# Patient Record
Sex: FEMALE | Race: WHITE | Employment: OTHER | URBAN - METROPOLITAN AREA
[De-identification: names, ages, dates, MRNs, and addresses within clinical notes are randomized per-mention and may not be internally consistent; named-entity substitution may affect disease eponyms.]

---

## 2017-05-05 ENCOUNTER — TELEPHONE (OUTPATIENT)
Dept: GASTROENTEROLOGY | Facility: CLINIC | Age: 62
End: 2017-05-05

## 2017-05-05 DIAGNOSIS — K74.3 PBC (PRIMARY BILIARY CIRRHOSIS): ICD-10-CM

## 2017-05-05 RX ORDER — URSODIOL 250 MG/1
250 TABLET, FILM COATED ORAL 4 TIMES DAILY
Qty: 148 TABLET | Refills: 1 | Status: SHIPPED | OUTPATIENT
Start: 2017-05-05 | End: 2017-07-03

## 2017-05-05 NOTE — TELEPHONE ENCOUNTER
----- Message from Evert Riggs MD sent at 5/5/2017  2:58 PM CDT -----  That is ok this time.  i won't refill again without a more recent clinic visit.    Thanks    ----- Message -----     From: Melany Greene LPN     Sent: 5/5/2017  10:56 AM       To: Melany Greene LPN, Evert Riggs MD    This pt doesn't see you until July and she is requesting a refill for ursodiol. Pt hasn't seen you since December 2015.

## 2017-05-08 ENCOUNTER — TELEPHONE (OUTPATIENT)
Dept: GASTROENTEROLOGY | Facility: CLINIC | Age: 62
End: 2017-05-08

## 2017-05-16 ENCOUNTER — TELEPHONE (OUTPATIENT)
Dept: GASTROENTEROLOGY | Facility: CLINIC | Age: 62
End: 2017-05-16

## 2017-05-19 ENCOUNTER — TELEPHONE (OUTPATIENT)
Dept: GASTROENTEROLOGY | Facility: CLINIC | Age: 62
End: 2017-05-19

## 2017-05-19 DIAGNOSIS — K74.3 PRIMARY BILIARY CIRRHOSIS (H): ICD-10-CM

## 2017-05-19 RX ORDER — URSODIOL 250 MG/1
250 TABLET, FILM COATED ORAL 4 TIMES DAILY
Qty: 360 TABLET | Refills: 0 | Status: SHIPPED | OUTPATIENT
Start: 2017-05-19 | End: 2017-07-03

## 2017-05-19 NOTE — TELEPHONE ENCOUNTER
----- Message from Sharon Byrne RN sent at 5/19/2017  9:55 AM CDT -----  Regarding: FW: Dr Brooks - Rx: ursodiol req refill be sent to mail order pharmacy  Contact: [phone]      ----- Message -----     From: Flori Varner     Sent: 5/19/2017   9:42 AM       To: Hepatology Nurses-  Subject: Dr Brooks - Rx: ursodiol req refill be sent to #    Pt called her mail order pharmacy and stated they have not received any refill reqs from the . She is almost out and wants to know when she can get that refilled. Pt can be reached at [phone] [facility] - Adult call center  Please DO NOT send this message and/or reply back to sender.  Call Center Representatives DO NOT respond to messages.

## 2017-05-19 NOTE — TELEPHONE ENCOUNTER
Informed pt that the ursodiol script was set for 90 days but with no refills as she would need to be seen in clinic. Pt states she has an appt in July.

## 2017-06-19 DIAGNOSIS — K74.3 PRIMARY BILIARY CIRRHOSIS (H): Primary | ICD-10-CM

## 2017-07-03 ENCOUNTER — OFFICE VISIT (OUTPATIENT)
Dept: GASTROENTEROLOGY | Facility: CLINIC | Age: 62
End: 2017-07-03
Attending: INTERNAL MEDICINE
Payer: COMMERCIAL

## 2017-07-03 VITALS
TEMPERATURE: 98.2 F | DIASTOLIC BLOOD PRESSURE: 84 MMHG | WEIGHT: 138 LBS | SYSTOLIC BLOOD PRESSURE: 166 MMHG | BODY MASS INDEX: 22.99 KG/M2 | HEIGHT: 65 IN | HEART RATE: 75 BPM

## 2017-07-03 DIAGNOSIS — K74.3 PRIMARY BILIARY CIRRHOSIS (H): Primary | ICD-10-CM

## 2017-07-03 DIAGNOSIS — D69.6 THROMBOCYTOPENIA (H): ICD-10-CM

## 2017-07-03 DIAGNOSIS — K74.3 PRIMARY BILIARY CIRRHOSIS (H): ICD-10-CM

## 2017-07-03 LAB
ALBUMIN SERPL-MCNC: 3.7 G/DL (ref 3.4–5)
ALP SERPL-CCNC: 147 U/L (ref 40–150)
ALT SERPL W P-5'-P-CCNC: 34 U/L (ref 0–50)
ANION GAP SERPL CALCULATED.3IONS-SCNC: 7 MMOL/L (ref 3–14)
AST SERPL W P-5'-P-CCNC: 26 U/L (ref 0–45)
BILIRUB DIRECT SERPL-MCNC: 0.3 MG/DL (ref 0–0.2)
BILIRUB SERPL-MCNC: 1 MG/DL (ref 0.2–1.3)
BUN SERPL-MCNC: 12 MG/DL (ref 7–30)
CALCIUM SERPL-MCNC: 9.2 MG/DL (ref 8.5–10.1)
CHLORIDE SERPL-SCNC: 105 MMOL/L (ref 94–109)
CO2 SERPL-SCNC: 27 MMOL/L (ref 20–32)
CREAT SERPL-MCNC: 0.62 MG/DL (ref 0.52–1.04)
ERYTHROCYTE [DISTWIDTH] IN BLOOD BY AUTOMATED COUNT: 12.7 % (ref 10–15)
GFR SERPL CREATININE-BSD FRML MDRD: ABNORMAL ML/MIN/1.7M2
GLUCOSE SERPL-MCNC: 103 MG/DL (ref 70–99)
HCT VFR BLD AUTO: 41 % (ref 35–47)
HGB BLD-MCNC: 13.4 G/DL (ref 11.7–15.7)
INR PPP: 1.04 (ref 0.86–1.14)
MCH RBC QN AUTO: 30.7 PG (ref 26.5–33)
MCHC RBC AUTO-ENTMCNC: 32.7 G/DL (ref 31.5–36.5)
MCV RBC AUTO: 94 FL (ref 78–100)
PLATELET # BLD AUTO: 68 10E9/L (ref 150–450)
POTASSIUM SERPL-SCNC: 4.1 MMOL/L (ref 3.4–5.3)
PROT SERPL-MCNC: 7.7 G/DL (ref 6.8–8.8)
RBC # BLD AUTO: 4.37 10E12/L (ref 3.8–5.2)
SODIUM SERPL-SCNC: 139 MMOL/L (ref 133–144)
WBC # BLD AUTO: 6.4 10E9/L (ref 4–11)

## 2017-07-03 PROCEDURE — 80048 BASIC METABOLIC PNL TOTAL CA: CPT | Performed by: INTERNAL MEDICINE

## 2017-07-03 PROCEDURE — 85027 COMPLETE CBC AUTOMATED: CPT | Performed by: INTERNAL MEDICINE

## 2017-07-03 PROCEDURE — 36415 COLL VENOUS BLD VENIPUNCTURE: CPT | Performed by: INTERNAL MEDICINE

## 2017-07-03 PROCEDURE — 85610 PROTHROMBIN TIME: CPT | Performed by: INTERNAL MEDICINE

## 2017-07-03 PROCEDURE — 99212 OFFICE O/P EST SF 10 MIN: CPT | Mod: ZF

## 2017-07-03 PROCEDURE — 80076 HEPATIC FUNCTION PANEL: CPT | Performed by: INTERNAL MEDICINE

## 2017-07-03 RX ORDER — ESTRADIOL 10 UG/1
10 INSERT VAGINAL
COMMUNITY
End: 2019-02-26

## 2017-07-03 ASSESSMENT — PAIN SCALES - GENERAL: PAINLEVEL: NO PAIN (0)

## 2017-07-03 NOTE — NURSING NOTE
"Chief Complaint   Patient presents with     RECHECK     PBC f/u per patient       Initial /84  Pulse 75  Temp 98.2  F (36.8  C) (Oral)  Ht 1.638 m (5' 4.5\")  Wt 62.6 kg (138 lb)  BMI 23.32 kg/m2 Estimated body mass index is 23.32 kg/(m^2) as calculated from the following:    Height as of this encounter: 1.638 m (5' 4.5\").    Weight as of this encounter: 62.6 kg (138 lb).  Medication Reconciliation: complete  "

## 2017-07-03 NOTE — MR AVS SNAPSHOT
After Visit Summary   7/3/2017    Shasha Andrews    MRN: [id number]           Patient Information     Date Of Birth          1955        Visit Information        Provider Department      7/3/2017 4:00 PM Evert Herrera MD [facility] Hepatology        Today's Diagnoses     Primary biliary cirrhosis (H)    -  1       Follow-ups after your visit        Follow-up notes from your care team     Return in about 6 months (around 1/3/2018).      Your next 10 appointments already scheduled     Jul 08, 2017 11:00 AM CDT   US ABDOMEN COMPLETE with US[address] [place] [address] Imaging Center US ([facility])    [address] [place] [address] [id number] [phone]           Please bring a list of your medicines (including vitamins, minerals and over-the-counter drugs). Also, tell your doctor about any allergies you may have. Wear comfortable clothes and leave your valuables at home.  Adults: No eating or drinking for 8 hours before the exam. You may take medicine with a small sip of water.  Children: - Children 6+ years: No food or drink for 6 hours before exam. - Children 1-5 years: No food or drink for 4 hours before exam. - Infants, breast-fed: may have breast milk up to 2 hours before exam. - Infants, formula: may have bottle until 4 hours before exam.  Please call the Imaging Department at your exam site with any questions.            Jan 22, 2018  1:00 PM CST   Lab with  LAB   [facility] ([facility])    [address] [place] [id number] [phone]            Jan 22, 2018  2:00 PM CST   (Arrive by 1:45 PM)   Return General Liver with Evert Riggs MD   [facility] Hepatology ([facility])    [address] [place] [address] [id number] [phone]              Future tests that were ordered for you today     Open Future Orders        Priority Expected Expires  "Ordered    US Abdomen Complete Routine  7/3/2018 7/3/2017            Who to contact     If you have questions or need follow up information about today's clinic visit or your schedule please contact [facility] HEPATOLOGY directly at [phone].  Normal or non-critical lab and imaging results will be communicated to you by [email or web address], letter or phone within 4 business days after the clinic has received the results. If you do not hear from us within 7 days, please contact the clinic through [email or web address] or phone. If you have a critical or abnormal lab result, we will notify you by phone as soon as possible.  Submit refill requests through Nuji or call your pharmacy and they will forward the refill request to us. Please allow 3 business days for your refill to be completed.          Additional Information About Your Visit        Nuji Information     Nuji gives you secure access to your electronic health record. If you see a primary care provider, you can also send messages to your care team and make appointments. If you have questions, please call your primary care clinic.  If you do not have a primary care provider, please call [phone] and they will assist you.        Care EveryWhere ID     This is your Care EveryWhere ID. This could be used by other organizations to access your [place] medical records  [id number]        Your Vitals Were     Pulse Temperature Height BMI (Body Mass Index)          75 98.2  F (36.8  C) (Oral) 1.638 m (5' 4.5\") 23.32 kg/m2         Blood Pressure from Last 3 Encounters:   07/03/17 166/84   12/08/15 130/88   08/08/14 147/84    Weight from Last 3 Encounters:   07/03/17 62.6 kg (138 lb)   12/08/15 68.2 kg (150 lb 4.8 oz)   08/08/14 64 kg (141 lb)               Primary Care Provider Office Phone # Fax #    Favian Pastor [phone]       [facility] [address] [place] [address]        Equal Access to Services     ESTER HENRIQUEZ AH: Hadii aad ku " valentín Felix, santino coopergonzaloha, andrés kamelonie collisn, marlon montesin hayaan saritadian fredericwillie lalesamichael soo. So [facility] [phone].    ATENCIÓN: Si habla karenañol, tiene a thompson disposición servicios gratuitos de asistencia lingüística. Chapis al [phone].    We comply with applicable federal civil rights laws and [place] laws. We do not discriminate on the basis of race, color, national origin, age, disability sex, sexual orientation or gender identity.            Thank you!     Thank you for choosing [facility] HEPATOLOG[facility]  for your care. Our goal is always to provide you with excellent care. Hearing back from our patients is one way we can continue to improve our services. Please take a few minutes to complete the written survey that you may receive in the mail after your visit with us. Thank you!             Your Updated Medication List - Protect others around you: Learn how to safely use, store and throw away your medicines at www.disposemymeds.org.          This list is accurate as of: 7/3/17  4:14 PM.  Always use your most recent med list.                   Brand Name Dispense Instructions for use Diagnosis    calcium carbonate-vitamin D 600-400 MG-UNIT Chew      Take 1 tablet by mouth daily        estradiol 10 MCG Tabs vaginal tablet    VAGIFEM     Place 10 mcg vaginally twice a week        ursodiol 250 MG tablet    ACTIGALL    360 tablet    Take 1 tablet (250 mg) by mouth 4 times daily    Primary biliary cirrhosis (H)       vitamin B complex with vitamin C Tabs tablet      Take 1 tablet by mouth daily        VITAMIN C PO      Take 1,000 mg by mouth daily        VITAMIN E COMPLEX PO      Take 400 Units by mouth daily

## 2017-07-03 NOTE — LETTER
7/3/2017       RE: Shasha Andrews  [address] [address]     Dear Colleague,    Thank you for referring your patient, Shasha Andrews, to the [facility] HEPATOLOGY at [facility]. Please see a copy of my visit note below.    [facility]    Hepatology follow-up    Follow-up visit for    Subjective:  61 year old female    Primary biliary cholangitis  - dx ~2008  - previously followed by Dr Springer, [facility]  - no prior liver biopsy  - MR elastography normal at that time  - on ursodiol since 2008    Patient presents to clinic for follow-up of PBC.  Last clinic visit was Dec 2015.  No new medications, ER visits or hospital admissions since that time.    Patient is well.  She denies any signs or symptoms specific to liver disease.    Patient denies itch, jaundice, lower extremity edema, abdominal distension, lethargy or confusion.    Patient denies melena, hematemesis or hematochezia.    Patient denies fevers, sweats or chills.  Weight stable.    Patient continues to drink 3+ beers 3 times per week.  She knows that she should not be drinking any alcohol with her history of chronic liver disease.      Medical hx Surgical hx   Past Medical History:   Diagnosis Date     Primary biliary cirrhosis (H)       Past Surgical History:   Procedure Laterality Date     CHOLECYSTECTOMY       TONSILLECTOMY       TUBAL LIGATION            Medications  Prior to Admission medications    Medication Sig Start Date End Date Taking? Authorizing Provider   VITAMIN E COMPLEX PO Take 400 Units by mouth daily   Yes Reported, Patient   Ascorbic Acid (VITAMIN C PO) Take 1,000 mg by mouth daily   Yes Reported, Patient   vitamin B complex with vitamin C (VITAMIN  B COMPLEX) TABS tablet Take 1 tablet by mouth daily   Yes Reported, Patient   calcium carbonate-vitamin D 600-400 MG-UNIT CHEW Take 1 tablet by mouth daily   Yes Reported, Patient   estradiol (VAGIFEM) 10 MCG  "TABS vaginal tablet Place 10 mcg vaginally twice a week   Yes Reported, Patient   ursodiol (ACTIGALL) 250 MG tablet Take 1 tablet (250 mg) by mouth 4 times daily 3/31/16  Yes Evert Herrera MD       Allergies  Allergies   Allergen Reactions     Penicillins        Review of systems  A 10-point review of systems was negative    Examination  /84  Pulse 75  Temp 98.2  F (36.8  C) (Oral)  Ht 1.638 m (5' 4.5\")  Wt 62.6 kg (138 lb)  BMI 23.32 kg/m2  Body mass index is 23.32 kg/(m^2).    Gen- well, NAD, A+Ox3, normal color  Lym- no palpable LAD  CVS- RRR  RS- CTA, ribs palpable through skin  Abd- soft, non-tender, no ascites or organomegaly on palpation or percussion, normal BS  Extr- hands normal, no MARY JANE  Skin- no rash or jaundice  Neuro- no asterixis  Psych- normal mood    Laboratory  Lab Results   Component Value Date     07/03/2017    POTASSIUM 4.1 07/03/2017    CHLORIDE 105 07/03/2017    CO2 27 07/03/2017    BUN 12 07/03/2017    CR 0.62 07/03/2017       Lab Results   Component Value Date    BILITOTAL 1.0 07/03/2017    ALT 34 07/03/2017    AST 26 07/03/2017    ALKPHOS 147 07/03/2017       Lab Results   Component Value Date    ALBUMIN 3.7 07/03/2017    PROTTOTAL 7.7 07/03/2017        Lab Results   Component Value Date    WBC 6.4 07/03/2017    HGB 13.4 07/03/2017    MCV 94 07/03/2017    PLT 68 07/03/2017       Lab Results   Component Value Date    INR 1.04 07/03/2017       Radiology  Nil recent    Assessment  61 year old female who presents for follow-up of PBC.  Ursodiol appropriately dosed.  Concern for development of cirrhosis in context of ongoing alcohol misuse and thrombocytopenia on blood work today.  Will obtain abdominal ultrasound to assess for nodular contour and/or splenomegaly.  If no evidence of cirrhosis on ultrasound, will require liver biopsy.    Plan  1.  Continue ursodiol  2.  Abdominal U/S  3.  If abdominal U/S negative for cirrhosis, liver biopsy  4.  Follow-up in 6 " months    Evert Brooks MD  Hepatology  [facility]

## 2017-07-03 NOTE — PROGRESS NOTES
[facility]    Hepatology follow-up    Follow-up visit for    Subjective:  61 year old female    Primary biliary cholangitis  - dx ~2008  - previously followed by Dr Springer, [facility]  - no prior liver biopsy  - MR elastography normal at that time  - on ursodiol since 2008    Patient presents to clinic for follow-up of PBC.  Last clinic visit was Dec 2015.  No new medications, ER visits or hospital admissions since that time.    Patient is well.  She denies any signs or symptoms specific to liver disease.    Patient denies itch, jaundice, lower extremity edema, abdominal distension, lethargy or confusion.    Patient denies melena, hematemesis or hematochezia.    Patient denies fevers, sweats or chills.  Weight stable.    Patient continues to drink 3+ beers 3 times per week.  She knows that she should not be drinking any alcohol with her history of chronic liver disease.      Medical hx Surgical hx   Past Medical History:   Diagnosis Date     Primary biliary cirrhosis (H)       Past Surgical History:   Procedure Laterality Date     CHOLECYSTECTOMY       TONSILLECTOMY       TUBAL LIGATION            Medications  Prior to Admission medications    Medication Sig Start Date End Date Taking? Authorizing Provider   VITAMIN E COMPLEX PO Take 400 Units by mouth daily   Yes Reported, Patient   Ascorbic Acid (VITAMIN C PO) Take 1,000 mg by mouth daily   Yes Reported, Patient   vitamin B complex with vitamin C (VITAMIN  B COMPLEX) TABS tablet Take 1 tablet by mouth daily   Yes Reported, Patient   calcium carbonate-vitamin D 600-400 MG-UNIT CHEW Take 1 tablet by mouth daily   Yes Reported, Patient   estradiol (VAGIFEM) 10 MCG TABS vaginal tablet Place 10 mcg vaginally twice a week   Yes Reported, Patient   ursodiol (ACTIGALL) 250 MG tablet Take 1 tablet (250 mg) by mouth 4 times daily 3/31/16  Yes Brooks Evert Anton MD       Allergies  Allergies   Allergen Reactions     Penicillins   "      Review of systems  A 10-point review of systems was negative    Examination  /84  Pulse 75  Temp 98.2  F (36.8  C) (Oral)  Ht 1.638 m (5' 4.5\")  Wt 62.6 kg (138 lb)  BMI 23.32 kg/m2  Body mass index is 23.32 kg/(m^2).    Gen- well, NAD, A+Ox3, normal color  Lym- no palpable LAD  CVS- RRR  RS- CTA, ribs palpable through skin  Abd- soft, non-tender, no ascites or organomegaly on palpation or percussion, normal BS  Extr- hands normal, no MARY JANE  Skin- no rash or jaundice  Neuro- no asterixis  Psych- normal mood    Laboratory  Lab Results   Component Value Date     07/03/2017    POTASSIUM 4.1 07/03/2017    CHLORIDE 105 07/03/2017    CO2 27 07/03/2017    BUN 12 07/03/2017    CR 0.62 07/03/2017       Lab Results   Component Value Date    BILITOTAL 1.0 07/03/2017    ALT 34 07/03/2017    AST 26 07/03/2017    ALKPHOS 147 07/03/2017       Lab Results   Component Value Date    ALBUMIN 3.7 07/03/2017    PROTTOTAL 7.7 07/03/2017        Lab Results   Component Value Date    WBC 6.4 07/03/2017    HGB 13.4 07/03/2017    MCV 94 07/03/2017    PLT 68 07/03/2017       Lab Results   Component Value Date    INR 1.04 07/03/2017       Radiology  Nil recent    Assessment  61 year old female who presents for follow-up of PBC.  Ursodiol appropriately dosed.  Concern for development of cirrhosis in context of ongoing alcohol misuse and thrombocytopenia on blood work today.  Will obtain abdominal ultrasound to assess for nodular contour and/or splenomegaly.  If no evidence of cirrhosis on ultrasound, will require liver biopsy.    Plan  1.  Continue ursodiol  2.  Abdominal U/S  3.  If abdominal U/S negative for cirrhosis, liver biopsy  4.  Follow-up in 6 months    Evert Brooks MD  Hepatology  [facility]  "

## 2017-07-08 ENCOUNTER — HEALTH MAINTENANCE LETTER (OUTPATIENT)
Age: 62
End: 2017-07-08

## 2017-07-12 ENCOUNTER — MYC MEDICAL ADVICE (OUTPATIENT)
Dept: GASTROENTEROLOGY | Facility: CLINIC | Age: 62
End: 2017-07-12

## 2017-07-12 DIAGNOSIS — D69.6 THROMBOCYTOPENIA (H): ICD-10-CM

## 2017-07-12 DIAGNOSIS — K74.3 PRIMARY BILIARY CHOLANGITIS (H): Primary | ICD-10-CM

## 2017-07-12 DIAGNOSIS — Z78.9 ALCOHOL USE: ICD-10-CM

## 2017-07-13 DIAGNOSIS — K74.3 PRIMARY BILIARY CIRRHOSIS (H): ICD-10-CM

## 2017-07-13 RX ORDER — URSODIOL 250 MG/1
250 TABLET, FILM COATED ORAL 4 TIMES DAILY
Qty: 360 TABLET | Refills: 3 | Status: SHIPPED | OUTPATIENT
Start: 2017-07-13 | End: 2018-01-30

## 2017-07-13 NOTE — TELEPHONE ENCOUNTER
Drug Name: ursodiol 250mg  Last Fill Date: 5/20/17  Quantity: 360    Guerline Kavita   [facility]  [phone]

## 2018-01-23 DIAGNOSIS — K74.3 PRIMARY BILIARY CHOLANGITIS (H): Primary | ICD-10-CM

## 2018-01-30 ENCOUNTER — TELEPHONE (OUTPATIENT)
Dept: GASTROENTEROLOGY | Facility: CLINIC | Age: 63
End: 2018-01-30

## 2018-01-30 ENCOUNTER — OFFICE VISIT (OUTPATIENT)
Dept: GASTROENTEROLOGY | Facility: CLINIC | Age: 63
End: 2018-01-30
Attending: INTERNAL MEDICINE
Payer: COMMERCIAL

## 2018-01-30 VITALS
DIASTOLIC BLOOD PRESSURE: 84 MMHG | BODY MASS INDEX: 21.65 KG/M2 | TEMPERATURE: 98 F | WEIGHT: 126.8 LBS | HEART RATE: 72 BPM | HEIGHT: 64 IN | SYSTOLIC BLOOD PRESSURE: 152 MMHG

## 2018-01-30 DIAGNOSIS — K74.3 PRIMARY BILIARY CHOLANGITIS (H): ICD-10-CM

## 2018-01-30 DIAGNOSIS — K74.3 PRIMARY BILIARY CHOLANGITIS (H): Primary | ICD-10-CM

## 2018-01-30 LAB
ALBUMIN SERPL-MCNC: 3.9 G/DL (ref 3.4–5)
ALP SERPL-CCNC: 116 U/L (ref 40–150)
ALT SERPL W P-5'-P-CCNC: 25 U/L (ref 0–50)
ANION GAP SERPL CALCULATED.3IONS-SCNC: 5 MMOL/L (ref 3–14)
AST SERPL W P-5'-P-CCNC: 25 U/L (ref 0–45)
BILIRUB DIRECT SERPL-MCNC: 0.3 MG/DL (ref 0–0.2)
BILIRUB SERPL-MCNC: 1.1 MG/DL (ref 0.2–1.3)
BUN SERPL-MCNC: 8 MG/DL (ref 7–30)
CALCIUM SERPL-MCNC: 9.4 MG/DL (ref 8.5–10.1)
CHLORIDE SERPL-SCNC: 103 MMOL/L (ref 94–109)
CO2 SERPL-SCNC: 29 MMOL/L (ref 20–32)
CREAT SERPL-MCNC: 0.56 MG/DL (ref 0.52–1.04)
ERYTHROCYTE [DISTWIDTH] IN BLOOD BY AUTOMATED COUNT: 13 % (ref 10–15)
GFR SERPL CREATININE-BSD FRML MDRD: >90 ML/MIN/1.7M2
GLUCOSE SERPL-MCNC: 89 MG/DL (ref 70–99)
HCT VFR BLD AUTO: 43.7 % (ref 35–47)
HGB BLD-MCNC: 14.3 G/DL (ref 11.7–15.7)
INR PPP: 0.98 (ref 0.86–1.14)
MCH RBC QN AUTO: 30.8 PG (ref 26.5–33)
MCHC RBC AUTO-ENTMCNC: 32.7 G/DL (ref 31.5–36.5)
MCV RBC AUTO: 94 FL (ref 78–100)
PLATELET # BLD AUTO: 97 10E9/L (ref 150–450)
POTASSIUM SERPL-SCNC: 3.7 MMOL/L (ref 3.4–5.3)
PROT SERPL-MCNC: 8 G/DL (ref 6.8–8.8)
RBC # BLD AUTO: 4.65 10E12/L (ref 3.8–5.2)
SODIUM SERPL-SCNC: 138 MMOL/L (ref 133–144)
WBC # BLD AUTO: 4.3 10E9/L (ref 4–11)

## 2018-01-30 PROCEDURE — 80048 BASIC METABOLIC PNL TOTAL CA: CPT | Performed by: INTERNAL MEDICINE

## 2018-01-30 PROCEDURE — 85027 COMPLETE CBC AUTOMATED: CPT | Performed by: INTERNAL MEDICINE

## 2018-01-30 PROCEDURE — G0463 HOSPITAL OUTPT CLINIC VISIT: HCPCS | Mod: ZF

## 2018-01-30 PROCEDURE — 91200 LIVER ELASTOGRAPHY: CPT | Mod: ZF

## 2018-01-30 PROCEDURE — 80076 HEPATIC FUNCTION PANEL: CPT | Performed by: INTERNAL MEDICINE

## 2018-01-30 PROCEDURE — 36415 COLL VENOUS BLD VENIPUNCTURE: CPT | Performed by: INTERNAL MEDICINE

## 2018-01-30 PROCEDURE — 85610 PROTHROMBIN TIME: CPT | Performed by: INTERNAL MEDICINE

## 2018-01-30 RX ORDER — SODIUM PHOSPHATE,MONO-DIBASIC 19G-7G/118
2 ENEMA (ML) RECTAL DAILY
COMMUNITY

## 2018-01-30 RX ORDER — SACCHAROMYCES BOULARDII 250 MG
250 CAPSULE ORAL 2 TIMES DAILY
COMMUNITY
End: 2021-03-22

## 2018-01-30 RX ORDER — URSODIOL 250 MG/1
250 TABLET, FILM COATED ORAL 4 TIMES DAILY
Qty: 360 TABLET | Refills: 3 | Status: SHIPPED | OUTPATIENT
Start: 2018-01-30 | End: 2019-01-07

## 2018-01-30 RX ORDER — OMEGA-3 FATTY ACIDS/FISH OIL 300-500 MG
2000 CAPSULE ORAL WEEKLY
COMMUNITY

## 2018-01-30 ASSESSMENT — PAIN SCALES - GENERAL: PAINLEVEL: NO PAIN (0)

## 2018-01-30 NOTE — NURSING NOTE
"Chief Complaint   Patient presents with     RECHECK     follow up with biliary cholangitis, tzimmer cma       Initial /84  Pulse 72  Temp 98  F (36.7  C) (Oral)  Ht 1.626 m (5' 4\")  Wt 57.5 kg (126 lb 12.8 oz)  BMI 21.77 kg/m2 Estimated body mass index is 21.77 kg/(m^2) as calculated from the following:    Height as of this encounter: 1.626 m (5' 4\").    Weight as of this encounter: 57.5 kg (126 lb 12.8 oz).  Medication Reconciliation: complete    "

## 2018-01-30 NOTE — PROGRESS NOTES
A/P  62 year old female with PBC diagnosed 8 years ago. On phoenix since with normal liver tests. Low platelets could be a results of alcohol contribution. She is not interested in a liver biopsy. She will get US fibrosis scan today. Renewed phoenix. Today we reviewed all of the labs, the diagnosis, treatment and sequelae related to having PBC, including risk of cirrhosis, decline in liver function, portal hypertension, low bone density, and HCC risk.    EGD: having colonoscopy tomorrow and we will call to add EGD  Bone density: due for update.   HCC screening: US in July . Will repeat this summer.   LIver function: normal labs and liver function. Labs every 6 months.  Alcohol use: stopped. Encouraged her with respect to the importance of this.  RTC 1 year.  This was a 45 minute visit, over 50% counseling and coordination of care.   =========================================  Subjective: 62 year old female with PBC. Initial diagnosis of PBC was made at [facility]. She had an elevated AMA (we do not have value) and a normal MRE. Alk phos was over 400 at that time. She was started on phoenix in 2010. She takes 250 mg QID.     Seen by Dr. Pankaj Schaeffer in 2014 then by Dr. Brooks after that with her last visit in July 2017. AMA in 2014 was 153.5.     Both Dr. Schaeffer and Dr. Brooks commented that her alcohol use was too high. She said today that she agrees with this. Quit alcohol 7/6/17. Taking some supplements like papaya and wheat grass. Trying to eat a healthy diet. She is worried about her platelet count, which has been below normal, today at 97. She had platelets done at [facility] in October and they were 201, but there is a note about clumping and large platelets.    Her liver tests and other labs have been normal. Today's labs reviewed with her. They are normal.    Last EGD: has not had  Last HCC screening: US July 2017. Normal spleen, mild coarsend liver architecture.  DEXA: last was in 2012. Osteopenia per pt report. On vit D and  "Ca.    SOC: Helping take care of grandchildren 8 and 10    Current Outpatient Prescriptions   Medication     ursodiol (ACTIGALL) 250 MG tablet     VITAMIN E COMPLEX PO     Ascorbic Acid (VITAMIN C PO)     vitamin B complex with vitamin C (VITAMIN  B COMPLEX) TABS tablet     calcium carbonate-vitamin D 600-400 MG-UNIT CHEW     estradiol (VAGIFEM) 10 MCG TABS vaginal tablet     No current facility-administered medications for this visit.        Vitals: /84  Pulse 72  Temp 98  F (36.7  C) (Oral)  Ht 1.626 m (5' 4\")  Wt 57.5 kg (126 lb 12.8 oz)  BMI 21.77 kg/m2  BMI= Body mass index is 21.77 kg/(m^2).   BMI= Body mass index is 30.69 kg/(m^2).   Constitutional: alert and no distress.   Head: Normocephalic. No masses, lesions, tenderness or abnormalities  Neck: Neck supple. No adenopathy. Thyroid symmetric, normal size  ENT: ENT exam normal, no neck nodes or sinus tenderness. No oral lesions  Cardiovascular: negative, No lifts, heaves, or thrills. RRR. No murmurs, clicks gallops or rub  Respiratory: negative, Good diaphragmatic excursion. Lungs clear  Gastrointestinal: Abdomen soft, non-tender. BS normal.  No masses, organomegaly  Skin: no suspicious lesions or rashes. No spider angiomata or palmar erythema. Nails normal.  Neurologic: Gait normal. Reflexes normal and symmetric. Sensation grossly WNL.  Psychiatric:  Appropriate, well groomed.  Hematologic/Lymphatic/Immunologic: Normal cervical and supraclavicular  lymph nodes    "

## 2018-01-30 NOTE — MR AVS SNAPSHOT
After Visit Summary   1/30/2018    Shasha Andrews    MRN: [id number]           Patient Information     Date Of Birth          1955        Visit Information        Provider Department      1/30/2018 9:30 AM Montserrat Magallanes MD [facility] Hepatology        Today's Diagnoses     Primary biliary cholangitis (H)    -  1       Follow-ups after your visit        Follow-up notes from your care team     Return in about 1 year (around 1/30/2019).      Your next 10 appointments already scheduled     Jan 30, 2018 10:30 AM CST   (Arrive by 10:15 AM)   FIBROSIS SCAN with  FIBROSIS SCAN   [facility] Hepatology ([facility])    [address]  [address]  [place] [id number] [phone]            Jan 25, 2019  9:00 AM CST   Lab with  LAB   [facility] Lab [facility])    [address]  1st Floor  [place] [id number] [phone]            Jan 25, 2019 10:00 AM CST   (Arrive by 9:45 AM)   Return General Liver with Montserrat Magallanes MD   [facility] Hepatology ([facility])    [address]  [address]  [place] [address] [phone]              Future tests that were ordered for you today     Open Future Orders        Priority Expected Expires Ordered    Hepatic panel Routine 7/30/2018 12/30/2018 1/30/2018    CBC with platelets Routine 7/30/2018 12/30/2018 1/30/2018    Fibrosis Scan (In-Clinic) Routine 1/30/2018 1/30/2019 1/30/2018    Dexa hip/pelvis/spine* Routine  3/1/2018 1/30/2018    UPPER GI ENDOSCOPY Routine 3/1/2018 7/29/2018 1/30/2018    INR Routine 7/30/2018 12/30/2018 1/30/2018    Basic metabolic panel Routine 7/30/2018 12/30/2018 1/30/2018            Who to contact     If you have questions or need follow up information about today's clinic visit or your schedule please contact [facility] HEPATOLOGY directly at [phone].  Normal or non-critical lab and imaging  "results will be communicated to you by MyChart, letter or phone within 4 business days after the clinic has received the results. If you do not hear from us within 7 days, please contact the clinic through [employer] or phone. If you have a critical or abnormal lab result, we will notify you by phone as soon as possible.  Submit refill requests through [employer] or call your pharmacy and they will forward the refill request to us. Please allow 3 business days for your refill to be completed.          Additional Information About Your Visit        [employer] Information     [employer] gives you secure access to your electronic health record. If you see a primary care provider, you can also send messages to your care team and make appointments. If you have questions, please call your primary care clinic.  If you do not have a primary care provider, please call [phone] and they will assist you.        Care EveryWhere ID     This is your Care EveryWhere ID. This could be used by other organizations to access your [facility] medical records  [id number]        Your Vitals Were     Pulse Temperature Height BMI (Body Mass Index)          72 98  F (36.7  C) (Oral) 1.626 m (5' 4\") 21.77 kg/m2         Blood Pressure from Last 3 Encounters:   01/30/18 152/84   07/03/17 166/84   12/08/15 130/88    Weight from Last 3 Encounters:   01/30/18 57.5 kg (126 lb 12.8 oz)   07/03/17 62.6 kg (138 lb)   12/08/15 68.2 kg (150 lb 4.8 oz)                 Where to get your medicines      These medications [facility] [place], [place] - [address] AT Portal to Registered [facility] Sites  [address], [facility] [address]     Phone:  [phone]     ursodiol 250 MG tablet          Primary Care Provider Office Phone # Fax #    Favian Pastor [phone]       [facility] [address]  [place] [address]        Equal Access to Services     ESTER HENRIQUEZ AH: Hadii aad ku " valentín Felix, wasierrada luqadaha, qaybta kamelonie collins, marlon mariscal saritadian nieves lalesamichael edrrick Solitario [facility] [phone].    ATENCIÓN: Si keanula eugene, tiene a thompson disposición servicios gratuitos de asistencia lingüística. Chapis al [phone].    We comply with applicable federal civil rights laws and [place] laws. We do not discriminate on the basis of race, color, national origin, age, disability, sex, sexual orientation, or gender identity.            Thank you!     Thank you for choosing [facility] HEPATOLOGY  for your care. Our goal is always to provide you with excellent care. Hearing back from our patients is one way we can continue to improve our services. Please take a few minutes to complete the written survey that you may receive in the mail after your visit with us. Thank you!             Your Updated Medication List - Protect others around you: Learn how to safely use, store and throw away your medicines at www.disposemymeds.org.          This list is accurate as of 1/30/18 10:25 AM.  Always use your most recent med list.                   Brand Name Dispense Instructions for use Diagnosis    calcium carbonate-vitamin D 600-400 MG-UNIT Chew      Take 1 tablet by mouth daily        estradiol 10 MCG Tabs vaginal tablet    VAGIFEM     Place 10 mcg vaginally twice a week        GLUCOSAMINE SULFATE PO      Take by mouth 2 times daily    Primary biliary cholangitis (H)       glucosamine-chondroitin 500-400 MG Caps per capsule       Primary biliary cholangitis (H)       RA FISH OIL 1000 MG Caps       Primary biliary cholangitis (H)       saccharomyces boulardii 250 MG capsule    FLORASTOR     Take 250 mg by mouth 2 times daily    Primary biliary cholangitis (H)       ursodiol 250 MG tablet    ACTIGALL    360 tablet    Take 1 tablet (250 mg) by mouth 4 times daily    Primary biliary cholangitis (H)       vitamin B complex with vitamin C Tabs tablet      Take 1 tablet by mouth daily        VITAMIN C PO       Take 1,000 mg by mouth daily        VITAMIN E COMPLEX PO      Take 400 Units by mouth daily

## 2018-01-30 NOTE — TELEPHONE ENCOUNTER
Writer spoke to Sandy  ([phone]) from [facility] and received verbal confirmation that the EGD can be can follow pt's colonoscopy for tomorrow. Order was faxed to ([phone]). Message was left on pt's VM as well.

## 2018-01-31 ENCOUNTER — TRANSFERRED RECORDS (OUTPATIENT)
Dept: HEALTH INFORMATION MANAGEMENT | Facility: CLINIC | Age: 63
End: 2018-01-31

## 2018-02-01 ENCOUNTER — MYC MEDICAL ADVICE (OUTPATIENT)
Dept: GASTROENTEROLOGY | Facility: CLINIC | Age: 63
End: 2018-02-01

## 2018-02-01 NOTE — TELEPHONE ENCOUNTER
Pt is a little confused about what to do now. Pt had both EGD and colonoscopy done yesterday and was told that both tests look fine. Have not received reports yet. Pt wants to know what herbs or diet she should take to prevent further damage. When did you want pt to come back?

## 2018-02-01 NOTE — TELEPHONE ENCOUNTER
I'm glad the colnoscopy and EGD were normal.  There are no herbs or special diet for liver disease. The best thing for her is to take the phoneix and continue with no alcohol.   Since her labs look great, her disease is stable and she does not have cirrhosis, she can return in 1 year. She can come in 6 months if she feels like she needs to be seen prior to 1 year.

## 2018-02-01 NOTE — TELEPHONE ENCOUNTER
Please see the message that I sent at 9:08 am this morning. It explains your test results and the best treatment for you.    Sincerely,  Dr. Magallanes

## 2018-02-02 NOTE — TELEPHONE ENCOUNTER
Discussed Dr. Magallanes's with pt. Pt was pleased. Pt will plan to have blood drawn @ PCP in 6 months. Pt will call me directly to let me know where to fax lab orders.

## 2018-05-14 ENCOUNTER — TELEPHONE (OUTPATIENT)
Dept: GASTROENTEROLOGY | Facility: CLINIC | Age: 63
End: 2018-05-14

## 2018-05-14 NOTE — TELEPHONE ENCOUNTER
[facility] Call Center    Phone Message    May a detailed message be left on voicemail: yes    Reason for Call: Medication Question or concern regarding medication   Prescription Clarification  Name of Medication: Claritin  Prescribing Provider: Dr. Magallanes   Pharmacy: OTC   What on the order needs clarification? Can pt take Claritin? Please follow up at your earliest convenience.      Action Taken: Message routed to:  [facility] Hepatology

## 2018-07-18 ENCOUNTER — MYC MEDICAL ADVICE (OUTPATIENT)
Dept: GASTROENTEROLOGY | Facility: CLINIC | Age: 63
End: 2018-07-18

## 2018-07-18 ENCOUNTER — EXTERNAL ORDER RESULTS (OUTPATIENT)
Dept: GASTROENTEROLOGY | Facility: CLINIC | Age: 63
End: 2018-07-18

## 2018-07-18 LAB
ERYTHROCYTE [DISTWIDTH] IN BLOOD BY AUTOMATED COUNT: 12.7 %
HCT VFR BLD AUTO: 39.8 %
HEMOGLOBIN: 13.4 G/DL (ref 11.7–15.7)
MCH RBC QN AUTO: 30.7 PG
MCHC RBC AUTO-ENTMCNC: 33.7 G/DL
MCV RBC AUTO: 91 FL
PLATELET # BLD AUTO: NORMAL 10^9/L
RBC # BLD AUTO: 4.36 10^12/L
WBC # BLD AUTO: 6.1 10^9/L

## 2018-07-19 NOTE — TELEPHONE ENCOUNTER
It is not necessary to redraw to the platelets. They are low in liver disease and I do not have a specific concern or reason why it would need to be redrawn.

## 2019-01-07 ENCOUNTER — TELEPHONE (OUTPATIENT)
Dept: GASTROENTEROLOGY | Facility: CLINIC | Age: 64
End: 2019-01-07

## 2019-01-07 DIAGNOSIS — K74.3 PRIMARY BILIARY CHOLANGITIS (H): ICD-10-CM

## 2019-01-07 RX ORDER — URSODIOL 250 MG/1
250 TABLET, FILM COATED ORAL 4 TIMES DAILY
Qty: 360 TABLET | Refills: 0 | Status: SHIPPED | OUTPATIENT
Start: 2019-01-07 | End: 2019-01-23

## 2019-01-07 NOTE — TELEPHONE ENCOUNTER
RX: ursodiol (ACTIGALL) 250 MG tablet.  Pts appt with Dr. Magallanes had to be rescheduled, per clinic; however, appt is not until 2/2/19 and pt will run out of this med in January 2019.  Pt is requesting Dr. Magallanes send in a refill for a 3 month supply, as her insurance prefers 3 months.  Please follow up with pt.  Thank you!

## 2019-01-23 DIAGNOSIS — K74.3 PRIMARY BILIARY CHOLANGITIS (H): ICD-10-CM

## 2019-01-23 RX ORDER — URSODIOL 250 MG/1
250 TABLET, FILM COATED ORAL 4 TIMES DAILY
Qty: 360 TABLET | Refills: 1 | Status: SHIPPED | OUTPATIENT
Start: 2019-01-23 | End: 2019-02-26

## 2019-02-20 DIAGNOSIS — K74.3 PRIMARY BILIARY CHOLANGITIS (H): Primary | ICD-10-CM

## 2019-02-26 ENCOUNTER — OFFICE VISIT (OUTPATIENT)
Dept: GASTROENTEROLOGY | Facility: CLINIC | Age: 64
End: 2019-02-26
Attending: INTERNAL MEDICINE
Payer: COMMERCIAL

## 2019-02-26 ENCOUNTER — TELEPHONE (OUTPATIENT)
Dept: GASTROENTEROLOGY | Facility: CLINIC | Age: 64
End: 2019-02-26

## 2019-02-26 VITALS
HEART RATE: 59 BPM | BODY MASS INDEX: 22.53 KG/M2 | OXYGEN SATURATION: 95 % | SYSTOLIC BLOOD PRESSURE: 162 MMHG | HEIGHT: 64 IN | WEIGHT: 132 LBS | RESPIRATION RATE: 16 BRPM | TEMPERATURE: 97.7 F | DIASTOLIC BLOOD PRESSURE: 91 MMHG

## 2019-02-26 DIAGNOSIS — Z23 ENCOUNTER FOR VACCINATION: ICD-10-CM

## 2019-02-26 DIAGNOSIS — K74.3 PRIMARY BILIARY CHOLANGITIS (H): Primary | ICD-10-CM

## 2019-02-26 DIAGNOSIS — K74.3 PRIMARY BILIARY CHOLANGITIS (H): ICD-10-CM

## 2019-02-26 LAB
ALBUMIN SERPL-MCNC: 3.8 G/DL (ref 3.4–5)
ALP SERPL-CCNC: 150 U/L (ref 40–150)
ALT SERPL W P-5'-P-CCNC: 25 U/L (ref 0–50)
ANION GAP SERPL CALCULATED.3IONS-SCNC: 6 MMOL/L (ref 3–14)
AST SERPL W P-5'-P-CCNC: 26 U/L (ref 0–45)
BILIRUB DIRECT SERPL-MCNC: 0.3 MG/DL (ref 0–0.2)
BILIRUB SERPL-MCNC: 1.1 MG/DL (ref 0.2–1.3)
BUN SERPL-MCNC: 9 MG/DL (ref 7–30)
CALCIUM SERPL-MCNC: 9 MG/DL (ref 8.5–10.1)
CHLORIDE SERPL-SCNC: 104 MMOL/L (ref 94–109)
CO2 SERPL-SCNC: 28 MMOL/L (ref 20–32)
CREAT SERPL-MCNC: 0.57 MG/DL (ref 0.52–1.04)
ERYTHROCYTE [DISTWIDTH] IN BLOOD BY AUTOMATED COUNT: 12.8 % (ref 10–15)
GFR SERPL CREATININE-BSD FRML MDRD: >90 ML/MIN/{1.73_M2}
GLUCOSE SERPL-MCNC: 79 MG/DL (ref 70–99)
HCT VFR BLD AUTO: 45 % (ref 35–47)
HGB BLD-MCNC: 14.1 G/DL (ref 11.7–15.7)
INR PPP: 1.05 (ref 0.86–1.14)
MCH RBC QN AUTO: 29.2 PG (ref 26.5–33)
MCHC RBC AUTO-ENTMCNC: 31.3 G/DL (ref 31.5–36.5)
MCV RBC AUTO: 93 FL (ref 78–100)
PLATELET # BLD AUTO: 124 10E9/L (ref 150–450)
POTASSIUM SERPL-SCNC: 3.8 MMOL/L (ref 3.4–5.3)
PROT SERPL-MCNC: 8.4 G/DL (ref 6.8–8.8)
RBC # BLD AUTO: 4.83 10E12/L (ref 3.8–5.2)
SODIUM SERPL-SCNC: 139 MMOL/L (ref 133–144)
WBC # BLD AUTO: 4.8 10E9/L (ref 4–11)

## 2019-02-26 PROCEDURE — 36415 COLL VENOUS BLD VENIPUNCTURE: CPT | Performed by: INTERNAL MEDICINE

## 2019-02-26 PROCEDURE — 80048 BASIC METABOLIC PNL TOTAL CA: CPT | Performed by: INTERNAL MEDICINE

## 2019-02-26 PROCEDURE — 80076 HEPATIC FUNCTION PANEL: CPT | Performed by: INTERNAL MEDICINE

## 2019-02-26 PROCEDURE — 85027 COMPLETE CBC AUTOMATED: CPT | Performed by: INTERNAL MEDICINE

## 2019-02-26 PROCEDURE — 90471 IMMUNIZATION ADMIN: CPT | Mod: ZF

## 2019-02-26 PROCEDURE — 25000581 ZZH RX MED A9270 GY (STAT IND- M) 250: Mod: ZF | Performed by: INTERNAL MEDICINE

## 2019-02-26 PROCEDURE — G0463 HOSPITAL OUTPT CLINIC VISIT: HCPCS | Mod: 25,ZF

## 2019-02-26 PROCEDURE — 90750 HZV VACC RECOMBINANT IM: CPT | Mod: ZF | Performed by: INTERNAL MEDICINE

## 2019-02-26 PROCEDURE — 85610 PROTHROMBIN TIME: CPT | Performed by: INTERNAL MEDICINE

## 2019-02-26 RX ORDER — FAMOTIDINE 20 MG
1 TABLET ORAL DAILY
COMMUNITY

## 2019-02-26 RX ORDER — URSODIOL 250 MG/1
250 TABLET, FILM COATED ORAL 4 TIMES DAILY
Qty: 360 TABLET | Refills: 3 | Status: SHIPPED | OUTPATIENT
Start: 2019-02-26 | End: 2019-10-28

## 2019-02-26 RX ADMIN — ZOSTER VACCINE RECOMBINANT, ADJUVANTED 0.5 ML: KIT at 10:26

## 2019-02-26 SDOH — HEALTH STABILITY: MENTAL HEALTH: HOW OFTEN DO YOU HAVE A DRINK CONTAINING ALCOHOL?: NEVER

## 2019-02-26 ASSESSMENT — PAIN SCALES - GENERAL: PAINLEVEL: NO PAIN (0)

## 2019-02-26 ASSESSMENT — MIFFLIN-ST. JEOR: SCORE: 1143.75

## 2019-02-26 NOTE — TELEPHONE ENCOUNTER
[facility] Call Center    Phone Message    May a detailed message be left on voicemail: yes    Reason for Call: Other: Pt called in today and stated that she received her first Shingrix vaccine and is experiencing pain. Pt wants to know if icing the pain will help and if it is ok to do. Pt also stated that she does not want to get the second dose of the vaccine and would like some information as to what that will do. Pt is also wondering if the Shingrix vaccine is covered through [employer], writer informed the pt that they may call the back of their card to find out if it is covered or not. Pt also stated that she had labs done as well and does not see them in her My Chart. Please advise when available.     Action Taken: Message routed to:  [facility] Gastro

## 2019-02-26 NOTE — LETTER
2/26/2019     RE: Shasha Andrews  [address] [address]     Dear Colleague,    Thank you for referring your patient, Shasha Andrews, to the [facility] HEPATOLOGY at [facility]. Please see a copy of my visit note below.    A/P  62 year old female with PBC diagnosed  in about 2011. On phoenix since with normal liver tests. Renewed phoenix. Today we reviewed all of the labs, the diagnosis, treatment and sequelae related to having PBC, including risk of cirrhosis, decline in liver function, portal hypertension, low bone density, and HCC risk.     EGD normal in 2018. Due 2021  Bone density due for update. 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 Stopped alcohol 1.5 years ago and plt have improved.  Alcohol use stopped in 2017. Encouraged her with respect to the importance of this.  Proph Shingles shot today  RTC 1 year.  This was a  25 minute visit, over 50% counseling and coordination of care.   =========================================  Subjective: 62 year old female with PBC. Initial diagnosis of PBC was made at [place]. She had an elevated AMA (we do not have value) and a normal MRE. Alk phos was over 400 at that time. AMA in 2014 was 153.5. She was started on phoenix in 2010. She takes 250 mg QID.      Seen by Dr. Pankaj Schaeffer in 2014 then by Dr. Brooks after that then switched her care to me.       Both Dr. Schaeffer and Dr. Brooks commented that her alcohol use was too high. Quit alcohol 7/6/17. Taking some supplements like papaya and wheat grass. Trying to eat a healthy diet. She is worried about her platelet count, which has been below normal, today at 124.      Her liver tests and other labs have been normal. Today's labs reviewed with her. They are normal but LFTs are pending.    Liver Function Studies -   Recent Labs   Lab Test 01/30/18  0836   PROTTOTAL 8.0   ALBUMIN 3.9   BILITOTAL 1.1   ALKPHOS 116   AST  "25   ALT 25     CBC RESULTS:   Recent Labs   Lab Test 02/26/19  0815   WBC 4.8   RBC 4.83   HGB 14.1   HCT 45.0   MCV 93   MCH 29.2   MCHC 31.3*   RDW 12.8   *     Last EGD: 1/31/18 normal  Last HCC screening:  July 2017. Normal spleen, mild coarsened liver architecture.  DEXA: last was in 2012. Osteopenia per pt report. On vit D and Ca.     SOC: Helping take care of grandchildren 8 and 10         Current Outpatient Prescriptions   Medication     ursodiol (ACTIGALL) 250 MG tablet     VITAMIN E COMPLEX PO     Ascorbic Acid (VITAMIN C PO)     vitamin B complex with vitamin C (VITAMIN  B COMPLEX) TABS tablet     calcium carbonate-vitamin D 600-400 MG-UNIT CHEW     estradiol (VAGIFEM) 10 MCG TABS vaginal tablet      No current facility-administered medications for this visit.          BP (!) 162/91 (BP Location: Right arm, Patient Position: Sitting, Cuff Size: Adult Regular)   Pulse 59   Temp 97.7  F (36.5  C) (Oral)   Resp 16   Ht 1.626 m (5' 4\")   Wt 59.9 kg (132 lb)   SpO2 95%   BMI 22.66 kg/m        Constitutional: alert and no distress.   Head: Normocephalic. No masses, lesions, tenderness or abnormalities  Neck: Neck supple. No adenopathy. Thyroid symmetric, normal size  ENT: ENT exam normal, no neck nodes or sinus tenderness. No oral lesions  Cardiovascular: negative, No lifts, heaves, or thrills. RRR. No murmurs, clicks gallops or rub  Respiratory: negative, Good diaphragmatic excursion. Lungs clear  Gastrointestinal: Abdomen soft, non-tender. BS normal.  No masses, organomegaly  Skin: no suspicious lesions or rashes. No spider angiomata or palmar erythema. Nails normal.  Neurologic: Gait normal. Reflexes normal and symmetric. Sensation grossly WNL.  Psychiatric:  Appropriate, well groomed.  Hematologic/Lymphatic/Immunologic: Normal cervical and supraclavicular  lymph nodes     Again, thank you for allowing me to participate in the care of your patient.      Sincerely,    Montserrat Magallanes, " MD

## 2019-02-26 NOTE — NURSING NOTE
Prior to injection, verified patient identity using patient's name and date of birth.  Due to injection administration, patient instructed to remain in clinic for 15 minutes  afterwards, and to report any adverse reaction to me immediately.    First dose of Shingrix given per Dr. Magallanes orders, injection given without complication or questions. Reminded her to get second dose in 8 weeks to 6 months from today, she verbally understood.    Erma Colón [facility]  2/26/2019 2:35 PM

## 2019-02-26 NOTE — LETTER
2/26/2019      RE: Shasha Andrews  [address] [address]       A/P  62 year old female with PBC diagnosed  in about 2011. On phoenix since with normal liver tests. Renewed phoenix. Today we reviewed all of the labs, the diagnosis, treatment and sequelae related to having PBC, including risk of cirrhosis, decline in liver function, portal hypertension, low bone density, and HCC risk.     EGD normal in 2018. Due 2021  Bone density due for update. 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 Stopped alcohol 1.5 years ago and plt have improved.  Alcohol use stopped in 2017. Encouraged her with respect to the importance of this.  Proph Shingles shot today  RTC 1 year.  This was a  25 minute visit, over 50% counseling and coordination of care.   =========================================  Subjective: 62 year old female with PBC. Initial diagnosis of PBC was made at [place]. She had an elevated AMA (we do not have value) and a normal MRE. Alk phos was over 400 at that time. AMA in 2014 was 153.5. She was started on phoenix in 2010. She takes 250 mg QID.      Seen by Dr. Pankaj Schaeffer in 2014 then by Dr. Brooks after that then switched her care to me.       Both Dr. Schaeffer and Dr. Brooks commented that her alcohol use was too high. Quit alcohol 7/6/17. Taking some supplements like papaya and wheat grass. Trying to eat a healthy diet. She is worried about her platelet count, which has been below normal, today at 124.      Her liver tests and other labs have been normal. Today's labs reviewed with her. They are normal but LFTs are pending.    Liver Function Studies -   Recent Labs   Lab Test 01/30/18  0836   PROTTOTAL 8.0   ALBUMIN 3.9   BILITOTAL 1.1   ALKPHOS 116   AST 25   ALT 25     CBC RESULTS:   Recent Labs   Lab Test 02/26/19  0815   WBC 4.8   RBC 4.83   HGB 14.1   HCT 45.0   MCV 93   MCH 29.2   MCHC 31.3*   RDW 12.8   *     Last EGD: 1/31/18  "normal  Last HCC screening: [place] July 2017. Normal spleen, mild coarsened liver architecture.  DEXA: last was in 2012. Osteopenia per pt report. On vit D and Ca.     SOC: Helping take care of grandchildren 8 and 10         Current Outpatient Prescriptions   Medication     ursodiol (ACTIGALL) 250 MG tablet     VITAMIN E COMPLEX PO     Ascorbic Acid (VITAMIN C PO)     vitamin B complex with vitamin C (VITAMIN  B COMPLEX) TABS tablet     calcium carbonate-vitamin D 600-400 MG-UNIT CHEW     estradiol (VAGIFEM) 10 MCG TABS vaginal tablet      No current facility-administered medications for this visit.          BP (!) 162/91 (BP Location: Right arm, Patient Position: Sitting, Cuff Size: Adult Regular)   Pulse 59   Temp 97.7  F (36.5  C) (Oral)   Resp 16   Ht 1.626 m (5' 4\")   Wt 59.9 kg (132 lb)   SpO2 95%   BMI 22.66 kg/m        Constitutional: alert and no distress.   Head: Normocephalic. No masses, lesions, tenderness or abnormalities  Neck: Neck supple. No adenopathy. Thyroid symmetric, normal size  ENT: ENT exam normal, no neck nodes or sinus tenderness. No oral lesions  Cardiovascular: negative, No lifts, heaves, or thrills. RRR. No murmurs, clicks gallops or rub  Respiratory: negative, Good diaphragmatic excursion. Lungs clear  Gastrointestinal: Abdomen soft, non-tender. BS normal.  No masses, organomegaly  Skin: no suspicious lesions or rashes. No spider angiomata or palmar erythema. Nails normal.  Neurologic: Gait normal. Reflexes normal and symmetric. Sensation grossly WNL.  Psychiatric:  Appropriate, well groomed.  Hematologic/Lymphatic/Immunologic: Normal cervical and supraclavicular  lymph nodes             Montserrat Magallanes MD      "

## 2019-02-26 NOTE — PROGRESS NOTES
A/P  62 year old female with PBC diagnosed in about 2011. On phoenix since with normal liver tests. Renewed phoenix. Today we reviewed all of the labs, the diagnosis, treatment and sequelae related to having PBC, including risk of cirrhosis, decline in liver function, portal hypertension, low bone density, and HCC risk.     EGD normal in 2018. Due 2021  Bone density due for update. 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 Stopped alcohol 1.5 years ago and plt have improved.  Alcohol use stopped in 2017. Encouraged her with respect to the importance of this.  Proph Shingles shot today  RTC 1 year.  This was a 25 minute visit, over 50% counseling and coordination of care.   =========================================  Subjective: 62 year old female with PBC. Initial diagnosis of PBC was made at [place]. She had an elevated AMA (we do not have value) and a normal MRE. Alk phos was over 400 at that time. AMA in 2014 was 153.5. She was started on phoenix in 2010. She takes 250 mg QID.      Seen by Dr. Pankaj Schaeffer in 2014 then by Dr. Brooks after that then switched her care to me.       Both Dr. Schaeffer and Dr. Brooks commented that her alcohol use was too high. Quit alcohol 7/6/17. Taking some supplements like papaya and wheat grass. Trying to eat a healthy diet. She is worried about her platelet count, which has been below normal, today at 124.      Her liver tests and other labs have been normal. Today's labs reviewed with her. They are normal but LFTs are pending.    Liver Function Studies -   Recent Labs   Lab Test 01/30/18  0836   PROTTOTAL 8.0   ALBUMIN 3.9   BILITOTAL 1.1   ALKPHOS 116   AST 25   ALT 25     CBC RESULTS:   Recent Labs   Lab Test 02/26/19  0815   WBC 4.8   RBC 4.83   HGB 14.1   HCT 45.0   MCV 93   MCH 29.2   MCHC 31.3*   RDW 12.8   *     Last EGD: 1/31/18 normal  Last HCC screening: [place] July 2017. Normal spleen, mild coarsened liver  "architecture.  DEXA: last was in 2012. Osteopenia per pt report. On vit D and Ca.     SOC: Helping take care of grandchildren 8 and 10         Current Outpatient Prescriptions   Medication     ursodiol (ACTIGALL) 250 MG tablet     VITAMIN E COMPLEX PO     Ascorbic Acid (VITAMIN C PO)     vitamin B complex with vitamin C (VITAMIN  B COMPLEX) TABS tablet     calcium carbonate-vitamin D 600-400 MG-UNIT CHEW     estradiol (VAGIFEM) 10 MCG TABS vaginal tablet      No current facility-administered medications for this visit.          BP (!) 162/91 (BP Location: Right arm, Patient Position: Sitting, Cuff Size: Adult Regular)   Pulse 59   Temp 97.7  F (36.5  C) (Oral)   Resp 16   Ht 1.626 m (5' 4\")   Wt 59.9 kg (132 lb)   SpO2 95%   BMI 22.66 kg/m       Constitutional: alert and no distress.   Head: Normocephalic. No masses, lesions, tenderness or abnormalities  Neck: Neck supple. No adenopathy. Thyroid symmetric, normal size  ENT: ENT exam normal, no neck nodes or sinus tenderness. No oral lesions  Cardiovascular: negative, No lifts, heaves, or thrills. RRR. No murmurs, clicks gallops or rub  Respiratory: negative, Good diaphragmatic excursion. Lungs clear  Gastrointestinal: Abdomen soft, non-tender. BS normal.  No masses, organomegaly  Skin: no suspicious lesions or rashes. No spider angiomata or palmar erythema. Nails normal.  Neurologic: Gait normal. Reflexes normal and symmetric. Sensation grossly WNL.  Psychiatric:  Appropriate, well groomed.  Hematologic/Lymphatic/Immunologic: Normal cervical and supraclavicular  lymph nodes           "

## 2019-02-26 NOTE — NURSING NOTE
"Chief Complaint   Patient presents with     RECHECK     Primary Biliary Cholangitis       Vital signs:  Temp: 97.7  F (36.5  C) Temp src: Oral BP: (!) 162/91(provider notified) Pulse: 59   Resp: 16 SpO2: 95 %     Height: 162.6 cm (5' 4\") Weight: 59.9 kg (132 lb)  Estimated body mass index is 22.66 kg/m  as calculated from the following:    Height as of this encounter: 1.626 m (5' 4\").    Weight as of this encounter: 59.9 kg (132 lb).        Erma Colón [facility]  2/26/2019 9:32 AM      "

## 2019-02-27 ENCOUNTER — TELEPHONE (OUTPATIENT)
Dept: GASTROENTEROLOGY | Facility: CLINIC | Age: 64
End: 2019-02-27

## 2019-02-27 NOTE — TELEPHONE ENCOUNTER
[facility] Call Center    Phone Message    May a detailed message be left on voicemail: yes    Reason for Call: Order(s): Other:   Reason for requested: Dexa and Ultrasound  Date needed: today  Provider name: Elpidio      Action Taken: Message routed to:  [facility] Please fax the above orders placed yesterday to [facility] in [place] at [phone] or [phone]

## 2019-02-28 DIAGNOSIS — K74.3 PRIMARY BILIARY CHOLANGITIS (H): Primary | ICD-10-CM

## 2019-03-04 ENCOUNTER — TRANSFERRED RECORDS (OUTPATIENT)
Dept: HEALTH INFORMATION MANAGEMENT | Facility: CLINIC | Age: 64
End: 2019-03-04

## 2019-03-06 ENCOUNTER — DOCUMENTATION ONLY (OUTPATIENT)
Dept: GASTROENTEROLOGY | Facility: CLINIC | Age: 64
End: 2019-03-06

## 2019-03-06 ENCOUNTER — EXTERNAL ORDER RESULTS (OUTPATIENT)
Dept: GASTROENTEROLOGY | Facility: CLINIC | Age: 64
End: 2019-03-06

## 2019-03-06 DIAGNOSIS — Z53.9 ERRONEOUS ENCOUNTER--DISREGARD: Primary | ICD-10-CM

## 2019-03-06 NOTE — PROGRESS NOTES
Ultrasound shows changes consistent with known liver disease No evidence for any early liver cancer.    DEXA is pretty normal. Make sure you are getting adequat calcium (1500 mg/d) and vitamin D (800 international units/d)

## 2019-03-06 NOTE — PROGRESS NOTES
Called patient and let her know Dr. Magallanes reviewed her ultrasound and DEXA scan.      Per Dr. Magallanes:    Ultrasound shows changes consistent with known liver disease No evidence for any early liver cancer.     DEXA is pretty normal. Make sure you are getting adequat calcium (1500 mg/d) and vitamin D (800 international units/d)    Patient expressed understanding and had no further questions.

## 2019-05-13 ENCOUNTER — TELEPHONE (OUTPATIENT)
Dept: INTERNAL MEDICINE | Facility: CLINIC | Age: 64
End: 2019-05-13

## 2019-05-13 NOTE — TELEPHONE ENCOUNTER
[facility] Call Center    Phone Message    May a detailed message be left on voicemail: yes    Reason for Call: Other: pt called to schedule nurse visit for second shingles shot. please call to schedule.      Action Taken: Message routed to:  [facility] hep

## 2019-10-28 DIAGNOSIS — K74.3 PRIMARY BILIARY CHOLANGITIS (H): ICD-10-CM

## 2019-10-28 RX ORDER — URSODIOL 250 MG/1
250 TABLET, FILM COATED ORAL 4 TIMES DAILY
Qty: 360 TABLET | Refills: 3 | Status: SHIPPED | OUTPATIENT
Start: 2019-10-28 | End: 2020-03-20

## 2019-11-03 ENCOUNTER — HEALTH MAINTENANCE LETTER (OUTPATIENT)
Age: 64
End: 2019-11-03

## 2020-02-10 ENCOUNTER — HEALTH MAINTENANCE LETTER (OUTPATIENT)
Age: 65
End: 2020-02-10

## 2020-03-06 ENCOUNTER — TELEPHONE (OUTPATIENT)
Dept: GASTROENTEROLOGY | Facility: CLINIC | Age: 65
End: 2020-03-06

## 2020-03-06 DIAGNOSIS — K74.3 PRIMARY BILIARY CHOLANGITIS (H): Primary | ICD-10-CM

## 2020-03-06 NOTE — TELEPHONE ENCOUNTER
Labs needed for upcoming appointment entered and pt updated.    Izabel Payne LPN  Hepatology Clinic      --------   Health Call Center    Phone Message    May a detailed message be left on voicemail: yes     Reason for Call: Other: Patient needs lab orders in [facility] prior to 3/16/20, per patient.  Please follow up [facility] patient once lab orders are done. Thank you!     Action Taken: Message routed to:  [facility]): [facility] Hepatology Adult [facility]    Travel Screening: Not Applicable

## 2020-03-17 ENCOUNTER — TELEPHONE (OUTPATIENT)
Dept: GASTROENTEROLOGY | Facility: CLINIC | Age: 65
End: 2020-03-17

## 2020-03-17 DIAGNOSIS — K74.3 PRIMARY BILIARY CHOLANGITIS (H): ICD-10-CM

## 2020-03-17 LAB
ALBUMIN SERPL-MCNC: 3.5 G/DL (ref 3.4–5)
ALP SERPL-CCNC: 130 U/L (ref 40–150)
ALT SERPL W P-5'-P-CCNC: 28 U/L (ref 0–50)
ANION GAP SERPL CALCULATED.3IONS-SCNC: 5 MMOL/L (ref 3–14)
AST SERPL W P-5'-P-CCNC: 22 U/L (ref 0–45)
BILIRUB DIRECT SERPL-MCNC: 0.2 MG/DL (ref 0–0.2)
BILIRUB SERPL-MCNC: 1 MG/DL (ref 0.2–1.3)
BUN SERPL-MCNC: 13 MG/DL (ref 7–30)
CALCIUM SERPL-MCNC: 9.3 MG/DL (ref 8.5–10.1)
CHLORIDE SERPL-SCNC: 106 MMOL/L (ref 94–109)
CO2 SERPL-SCNC: 28 MMOL/L (ref 20–32)
CREAT SERPL-MCNC: 0.66 MG/DL (ref 0.52–1.04)
ERYTHROCYTE [DISTWIDTH] IN BLOOD BY AUTOMATED COUNT: 13 % (ref 10–15)
GFR SERPL CREATININE-BSD FRML MDRD: >90 ML/MIN/{1.73_M2}
GLUCOSE SERPL-MCNC: 86 MG/DL (ref 70–99)
HCT VFR BLD AUTO: 42.1 % (ref 35–47)
HGB BLD-MCNC: 13.9 G/DL (ref 11.7–15.7)
INR PPP: 0.97 (ref 0.86–1.14)
MCH RBC QN AUTO: 30.2 PG (ref 26.5–33)
MCHC RBC AUTO-ENTMCNC: 33 G/DL (ref 31.5–36.5)
MCV RBC AUTO: 91 FL (ref 78–100)
PLATELET # BLD AUTO: 39 10E9/L (ref 150–450)
POTASSIUM SERPL-SCNC: 4.5 MMOL/L (ref 3.4–5.3)
PROT SERPL-MCNC: 8 G/DL (ref 6.8–8.8)
RBC # BLD AUTO: 4.61 10E12/L (ref 3.8–5.2)
SODIUM SERPL-SCNC: 139 MMOL/L (ref 133–144)
WBC # BLD AUTO: 5.2 10E9/L (ref 4–11)

## 2020-03-17 PROCEDURE — 80048 BASIC METABOLIC PNL TOTAL CA: CPT | Performed by: INTERNAL MEDICINE

## 2020-03-17 PROCEDURE — 36415 COLL VENOUS BLD VENIPUNCTURE: CPT | Performed by: INTERNAL MEDICINE

## 2020-03-17 PROCEDURE — 85027 COMPLETE CBC AUTOMATED: CPT | Performed by: INTERNAL MEDICINE

## 2020-03-17 PROCEDURE — 80076 HEPATIC FUNCTION PANEL: CPT | Performed by: INTERNAL MEDICINE

## 2020-03-17 PROCEDURE — 85610 PROTHROMBIN TIME: CPT | Performed by: INTERNAL MEDICINE

## 2020-03-17 NOTE — TELEPHONE ENCOUNTER
Let patient know no need to get labs again and Dr. Magallanes will discuss lab results during telephone visit.    [facility]    Phone Message    May a detailed message be left on voicemail: yes     Reason for Call: Other: Patient called as her 3/17/20 labs show low platelets.  Therefore, patient is wondering if she should have labs redone before 3/20/20 phone appt with Dr. Magallanes?  Please follow up with patient.  Thank you!     Action Taken: Message routed to:  [facility] ([facility]): [facility] Hepatology Adult [facility]    Travel Screening: Not Applicable

## 2020-03-20 ENCOUNTER — VIRTUAL VISIT (OUTPATIENT)
Dept: GASTROENTEROLOGY | Facility: CLINIC | Age: 65
End: 2020-03-20
Attending: INTERNAL MEDICINE
Payer: COMMERCIAL

## 2020-03-20 DIAGNOSIS — K74.3 PRIMARY BILIARY CHOLANGITIS (H): ICD-10-CM

## 2020-03-20 RX ORDER — URSODIOL 250 MG/1
250 TABLET, FILM COATED ORAL 4 TIMES DAILY
Qty: 360 TABLET | Refills: 3 | Status: SHIPPED | OUTPATIENT
Start: 2020-03-20 | End: 2020-03-23

## 2020-03-20 NOTE — PROGRESS NOTES
"Shasha Andrews is a 64 year old female who is being evaluated via a billable telephone visit during the COVID-19 pandemic and clinic response to limit in-person visits.    The patient has been notified of following:     \"This telephone visit will be conducted via a call between you and your physician/provider. We have found that certain health care needs can be provided without the need for a physical exam.  This service lets us provide the care you need with a short phone conversation.  If a prescription is necessary we can send it directly to your pharmacy.  If lab work is needed we can place an order for that and you can then stop by our lab to have the test done at a later time.    If during the course of the call the physician/provider feels a telephone visit is not appropriate, you will not be charged for this service.\"     Consent has been obtained for this service by 1 care team member: No    I have reviewed and updated the patient's Past Medical History, Social History, Family History and Medication List.      Phone call contact time  Call Started at 8:30  Call Ended at 8:45            A/P  64 year old female with PBC diagnosed 10 years ago. On phoenix since with normal liver tests.  Fibrosis scan F2 in 2018. Renewed phoenix. Today we reviewed all of the labs, the diagnosis, treatment and sequelae related to having PBC, including risk of cirrhosis, decline in liver function, portal hypertension, low bone density, and HCC risk.     EGD: 2018. No varices. Due 2021  Bone density: osteopenia on DEXA 2019  HCC screening: US 3/2019. Will repeat this summer.   Liver function: normal LFTs  Thrombocytopenia: Could be related to liver disease. Will repeat in a few months.  Alcohol use: stopped 7/6/17. Encouraged her with respect to the importance of this.  RTC 1 year.    =========================================  Subjective: 64 year old female with PBC. Initial diagnosis of PBC was made at [place]. She had an elevated AMA " (we do not have value) and a normal MRE. Alk phos was over 400 at that time. AMA in 2014 was 153.5.She was started on phoenix in 2010. She takes 250 mg QID. Fibrosis scan in 2018 F2.     Seen by Dr. Pankaj Schaeffer in 2014 then by Dr. Brooks after that.   Both Dr. Schaeffer and Dr. Brooks commented that her alcohol use was too high. She agreed  with this. Quit alcohol 7/6/17. Taking some supplements like papaya and wheat grass. Trying to eat a healthy diet. She is worried about her platelet count, which has been below normal, today at 39  Her liver tests and other labs have been normal. Today's labs reviewed with her.     Lab Test 03/17/20  0913   PROTTOTAL 8.0   ALBUMIN 3.5   BILITOTAL 1.0   ALKPHOS 130   AST 22   ALT 28     Lab Test 03/17/20  0913   WBC 5.2   RBC 4.61   HGB 13.9   HCT 42.1   MCV 91   MCH 30.2   MCHC 33.0   RDW 13.0   PLT 39*      Last EGD: 2018  Last HCC screening: US 3/2019  DEXA:2019 Osteopenia per pt report. On vit D and Ca.     SOC: Helping take care of grandchildren    Current Outpatient Medications   Medication     Ascorbic Acid (VITAMIN C PO)     calcium carbonate-vitamin D 600-400 MG-UNIT CHEW     Coenzyme Q10 (CO Q 10 PO)     GLUCOSAMINE SULFATE PO     glucosamine-chondroitin 500-400 MG CAPS per capsule     Omega-3 Fatty Acids (RA FISH OIL) 1000 MG CAPS     saccharomyces boulardii (FLORASTOR) 250 MG capsule     UNABLE TO FIND     ursodiol (ACTIGALL) 250 MG tablet     vitamin B complex with vitamin C (VITAMIN  B COMPLEX) TABS tablet     Vitamin D, Cholecalciferol, 1000 units CAPS     VITAMIN E COMPLEX PO     No current facility-administered medications for this visit.    Probiotic  Zinc 50 mg  Papaya enzymes prn stomach upset  Collagen peptide for 2 weeks

## 2020-03-23 DIAGNOSIS — K74.3 PRIMARY BILIARY CHOLANGITIS (H): ICD-10-CM

## 2020-03-23 RX ORDER — URSODIOL 250 MG/1
250 TABLET, FILM COATED ORAL 4 TIMES DAILY
Qty: 360 TABLET | Refills: 3 | Status: SHIPPED | OUTPATIENT
Start: 2020-03-23 | End: 2020-11-27

## 2020-06-04 ENCOUNTER — MYC MEDICAL ADVICE (OUTPATIENT)
Dept: INFECTIOUS DISEASES | Facility: CLINIC | Age: 65
End: 2020-06-04

## 2020-06-10 NOTE — TELEPHONE ENCOUNTER
Writer called [facility] and confirmed they received orders for CBC and Abdominal Ultrasound. Called Shasha and left vm that she can call her clinic and schedule at her convenience.     Erma Colón, [facility] CMA  6/10/2020 2:40 PM

## 2020-06-24 ENCOUNTER — TRANSFERRED RECORDS (OUTPATIENT)
Dept: HEALTH INFORMATION MANAGEMENT | Facility: CLINIC | Age: 65
End: 2020-06-24

## 2020-11-16 ENCOUNTER — HEALTH MAINTENANCE LETTER (OUTPATIENT)
Age: 65
End: 2020-11-16

## 2020-11-26 DIAGNOSIS — K74.3 PRIMARY BILIARY CHOLANGITIS (H): ICD-10-CM

## 2020-11-27 RX ORDER — URSODIOL 250 MG/1
TABLET, FILM COATED ORAL
Qty: 360 TABLET | Refills: 3 | Status: SHIPPED | OUTPATIENT
Start: 2020-11-27 | End: 2021-10-08

## 2021-01-12 ENCOUNTER — TELEPHONE (OUTPATIENT)
Dept: GASTROENTEROLOGY | Facility: CLINIC | Age: 66
End: 2021-01-12

## 2021-01-13 ENCOUNTER — TELEPHONE (OUTPATIENT)
Dept: GASTROENTEROLOGY | Facility: CLINIC | Age: 66
End: 2021-01-13

## 2021-01-13 DIAGNOSIS — K74.3 PRIMARY BILIARY CHOLANGITIS (H): Primary | ICD-10-CM

## 2021-01-13 NOTE — TELEPHONE ENCOUNTER
Lab orders faxed.  Patient notified.    Jacquelyn PAPPAS LPN  Hepatology Clinic      [facility]    Phone Message    May a detailed message be left on voicemail: yes     Reason for Call: Order(s): Other:   Reason for requested: Lab orders, patient needs to have lab orders sent to [facility]  Date needed: ASAP  Provider name: Dr. Magallanes      Action Taken: Message routed to:  [facility] ([facility]): [facility] Hep    Travel Screening: Not Applicable

## 2021-01-26 NOTE — TELEPHONE ENCOUNTER
Called patient and asked how she is doing after she called stating she was experiencing pain after her first Shingrix vaccine.  Patient stated the pain is much improved after using heat and Epson salt baths.  Informed patient that a single dose will not protect her from the shingles. Patient stated she is aware and that she has six months before she will need another one and will decide if she wants to go through with a second dose again.      No other questions or concerns.     negative No joint pain, swelling or deformity; no limitation of movement

## 2021-03-22 ENCOUNTER — VIRTUAL VISIT (OUTPATIENT)
Dept: GASTROENTEROLOGY | Facility: CLINIC | Age: 66
End: 2021-03-22
Attending: INTERNAL MEDICINE
Payer: COMMERCIAL

## 2021-03-22 DIAGNOSIS — K74.3 PRIMARY BILIARY CHOLANGITIS (H): Primary | ICD-10-CM

## 2021-03-22 PROCEDURE — 99214 OFFICE O/P EST MOD 30 MIN: CPT | Mod: 95 | Performed by: INTERNAL MEDICINE

## 2021-03-22 ASSESSMENT — PAIN SCALES - GENERAL: PAINLEVEL: NO PAIN (0)

## 2021-03-22 NOTE — PROGRESS NOTES
[facility]  LIVER CLINIC FOLLOW UP  VIDEO VISIT      A/P  Ms. Andrews is a 65 Y F with PBC diagnosed 10 years ago. On phoenix since with normal liver tests.  Fibrosis scan F2 in 2018. Platelet count less than 100. Today we reviewed all of the labs, the diagnosis, treatment and sequelae related to having PBC, including risk of cirrhosis, decline in liver function, portal hypertension, low bone density, and HCC risk.    PBC Continue on phoenix. Renewed. Labs every 6 months.  Variceal screening EGD 2018. No varices. Due this year. Ordered.  Bone density osteopenia on DEXA 2019. Due 2022.   HCC screening US 3/9/21 Will repeat this summer. Ordered  Liver function normal LFTs. Labs every 6 months. Ordered  Thrombocytopenia Persistent over a year. Will get peripheral smear but suspect this is portal HTN.  Alcohol use stopped 7/6/17. Encouraged her with respect to the importance of this.  CRC screening Colonoscopy 1/31/18. Repeat 2025  Discussed Covid vaccine and recommendations to get vaccinated.  RTC 1 year.  =========================================  Subjective: Ms. Andrews 65 Y F with PBC. Initial diagnosis of PBC was made at [place]. She had an elevated AMA (we do not have value) and a normal MRE. Alk phos was over 400 at that time. AMA in 2014 was 153.5.She was started on phoenix in 2010. She takes 250 mg QID. Fibrosis scan in 2018 F2.     Seen by Dr. Pankaj Schaeffer in 2014 then by Dr. Brooks after that.   Quit alcohol 7/6/17.   Taking some supplements like papaya and wheat grass. Trying to eat a healthy diet. She is worried about her platelet count, which has been below normal in the last year or so, now in the 40s.  Her liver tests and other labs have been normal otherwise. Labs 3/9/21  Last EGD: 2018   Last HCC screening: US 3/9/20  DEXA:2019 Osteopenia per pt report. On vit D and Ca.     SOC: Helping take care of grandchildren    Current Outpatient Medications   Medication     Ascorbic Acid (VITAMIN C PO)      "calcium carbonate-vitamin D 600-400 MG-UNIT CHEW     Coenzyme Q10 (CO Q 10 PO)     glucosamine-chondroitin 500-400 MG CAPS per capsule     Omega-3 Fatty Acids (RA FISH OIL) 1000 MG CAPS     ursodiol (ACTIGALL) 250 MG tablet     vitamin B complex with vitamin C (VITAMIN  B COMPLEX) TABS tablet     Vitamin D, Cholecalciferol, 1000 units CAPS     VITAMIN E COMPLEX PO     GLUCOSAMINE SULFATE PO     saccharomyces boulardii (FLORASTOR) 250 MG capsule     UNABLE TO FIND     No current facility-administered medications for this visit.    Probiotic  Zinc 50 mg  Papaya enzymes prn stomach upset  Collagen peptide for 2 weeks    Exam  Gen Alert pleasant NAD  Resp No difficulty breathing. No cough  Skin No Jaundice  Eyes No icterus  Neuro GARZON  MSK no muscle wasting  Psyche Pleasant, appropriate. Well groomed.  Shasha Andrews is a 65 year old female who is being evaluated via a billable video visit.      The patient has been notified of following:   \"This video visit will be conducted via a call between you and your physician/provider. We have found that certain health care needs can be provided without the need for an in-person physical exam.  This service lets us provide the care you need with a video conversation.  If a prescription is necessary we can send it directly to your pharmacy.  If lab work is needed we can place an order for that and you can then stop by our lab to have the test done at a later time. Video visits are billed at different rates depending on your insurance coverage.  Please reach out to your insurance provider with any questions.  If during the course of the call the physician/provider feels a video visit is not appropriate, you will not be charged for this service.\"   Patient has given verbal consent for Video visit? Yes    Type of service:  Video Visit  Video Start Time: 1014  Video End Time 1042  Patient location: home  Will anyone else be joining your video visit? No  {If patient encounters " technical issues they should call [phone] :1  Distant Location (provider location):  [facility] HEPATOLOGY CLINIC [place]   Mode of Communication:  Video Conference via [employer]  I have reviewed and updated the patient's Past Medical History, Social History, Family History and Medication List.

## 2021-03-22 NOTE — PROGRESS NOTES
"Shasha is a 65 year old who is being evaluated via a billable video visit.      How would you like to obtain your AVS? MyChart  If the video visit is dropped, the invitation should be resent by: Text to cell phone: [phone]  Will anyone else be joining your video visit? No  {If patient encounters technical issues they should call [phone] :[id number]}    Video Start Time: {video visit start/end time for provider to select:152948}  Video-Visit Details    Type of service:  Video Visit    Video End Time:{video visit start/end time for provider to select:152948}    Originating Location (pt. Location): {video visit patient location:597872::\"Home\"}    Distant Location (provider location):  [facility] HEPATOLOGY CLINIC [place]     Platform used for Video Visit: {Virtual Visit Platforms:792038::\"[employer]\"}    "

## 2021-03-22 NOTE — PROGRESS NOTES
Left voicemail for patient to call back to set up telemedicine visit, will call again before appointment time.  Karen Madden CMA on 3/22/2021 at 9:47 AM

## 2021-03-22 NOTE — LETTER
3/22/2021         RE: Shasha Andrews  [address] [address]        Dear Colleague,    Thank you for referring your patient, Shasha Andrews, to the [facility] HEPATOLOGY CLINIC [place]. Please see a copy of my visit note below.    [facility]  LIVER CLINIC FOLLOW UP  VIDEO VISIT      A/P  Ms. Andrews is a 65 Y F with PBC diagnosed 10 years ago. On phoenix since with normal liver tests.  Fibrosis scan F2 in 2018. Platelet count less than 100. Today we reviewed all of the labs, the diagnosis, treatment and sequelae related to having PBC, including risk of cirrhosis, decline in liver function, portal hypertension, low bone density, and HCC risk.    PBC Continue on phoenix. Renewed. Labs every 6 months.  Variceal screening EGD 2018. No varices. Due this year. Ordered.  Bone density osteopenia on DEXA 2019. Due 2022.   HCC screening US 3/9/21 Will repeat this summer. Ordered  Liver function normal LFTs. Labs every 6 months. Ordered  Thrombocytopenia Persistent over a year. Will get peripheral smear but suspect this is portal HTN.  Alcohol use stopped 7/6/17. Encouraged her with respect to the importance of this.  CRC screening Colonoscopy 1/31/18. Repeat 2025  Discussed Covid vaccine and recommendations to get vaccinated.  RTC 1 year.  =========================================  Subjective: Ms. Andrews 65 Y F with PBC. Initial diagnosis of PBC was made at [place]. She had an elevated AMA (we do not have value) and a normal MRE. Alk phos was over 400 at that time. AMA in 2014 was 153.5.She was started on phoenix in 2010. She takes 250 mg QID. Fibrosis scan in 2018 F2.     Seen by Dr. Pankaj Schaeffer in 2014 then by Dr. Brooks after that.   Quit alcohol 7/6/17.   Taking some supplements like papaya and wheat grass. Trying to eat a healthy diet. She is worried about her platelet count, which has been below normal in the last year or so, now in the 40s.  Her liver tests and other labs have been  "normal otherwise. Labs 3/9/21  Last EGD: 2018   Last HCC screening: US 3/9/20  DEXA:2019 Osteopenia per pt report. On vit D and Ca.     SOC: Helping take care of grandchildren    Current Outpatient Medications   Medication     Ascorbic Acid (VITAMIN C PO)     calcium carbonate-vitamin D 600-400 MG-UNIT CHEW     Coenzyme Q10 (CO Q 10 PO)     glucosamine-chondroitin 500-400 MG CAPS per capsule     Omega-3 Fatty Acids (RA FISH OIL) 1000 MG CAPS     ursodiol (ACTIGALL) 250 MG tablet     vitamin B complex with vitamin C (VITAMIN  B COMPLEX) TABS tablet     Vitamin D, Cholecalciferol, 1000 units CAPS     VITAMIN E COMPLEX PO     GLUCOSAMINE SULFATE PO     saccharomyces boulardii (FLORASTOR) 250 MG capsule     UNABLE TO FIND     No current facility-administered medications for this visit.    Probiotic  Zinc 50 mg  Papaya enzymes prn stomach upset  Collagen peptide for 2 weeks    Exam  Gen Alert pleasant NAD  Resp No difficulty breathing. No cough  Skin No Jaundice  Eyes No icterus  Neuro GARZON  MSK no muscle wasting  Psyche Pleasant, appropriate. Well groomed.  Shasha Andrews is a 65 year old female who is being evaluated via a billable video visit.      The patient has been notified of following:   \"This video visit will be conducted via a call between you and your physician/provider. We have found that certain health care needs can be provided without the need for an in-person physical exam.  This service lets us provide the care you need with a video conversation.  If a prescription is necessary we can send it directly to your pharmacy.  If lab work is needed we can place an order for that and you can then stop by our lab to have the test done at a later time. Video visits are billed at different rates depending on your insurance coverage.  Please reach out to your insurance provider with any questions.  If during the course of the call the physician/provider feels a video visit is not appropriate, you will not be " "charged for this service.\"   Patient has given verbal consent for Video visit? Yes    Type of service:  Video Visit  Video Start Time: 1014  Video End Time 1042  Patient location: home  Will anyone else be joining your video visit? No  {If patient encounters technical issues they should call [phone] :1  Distant Location (provider location):  [facility] HEPATOLOGY CLINIC [place]   Mode of Communication:  Video Conference via [employer]  I have reviewed and updated the patient's Past Medical History, Social History, Family History and Medication List.        Again, thank you for allowing me to participate in the care of your patient.        Sincerely,        Montserrat Magallanes MD    "

## 2021-08-25 ENCOUNTER — TELEPHONE (OUTPATIENT)
Dept: GASTROENTEROLOGY | Facility: CLINIC | Age: 66
End: 2021-08-25

## 2021-08-25 NOTE — TELEPHONE ENCOUNTER
Screening Questions  Are you active on mychart? yes  1. What insurance is in the chart? [employer] on chart    2.  Ordering/Referring Provider: Montserrat Magallanes MD in  HEPATOLOGY    3. BMI 20.3    4. Are you on daily home oxygen? no    5. Do you have a history of difficult airway? no    6. Have you had a heart, lung, or liver transplant? no    7. Are you currently on dialysis? no    8. Have you had a stroke or Transient ischemic atttack (TIA) within 6 months? no    9. In the past 6 months, have you had any heart related issues including cardiomyopathy or heart attack?         If yes, did it require cardiac stenting or other implantable device?no    10. Do you have any implantable devices in your body (pacemaker, defib, LVAD)? no    11. Do you take nitroglycerin? If yes, how often? no    12. Are you currently taking any blood thinners?no    13. Are you a diabetic? no    14. (Females) Are you currently pregnant? n/a  If yes, how many weeks?    15. Have you had a procedure in the past that was difficult to tolerate with conscious sedation? Any allergies to Fentanyl or Versed no    16. Are you taking any scheduled prescription narcotics more than once daily? no    17. Do you have any chemical dependencies such as alcohol, street drugs, or methadone? no    18. Do you have any history of post-traumatic stress syndrome or mental health issues? no    19. Do you transfer independently? yes    20.  Do you have any issues with constipation? no    21. Preferred Pharmacy for Pre Prescription on [facility] [facility] [place] off of [address]    Scheduling Details    Which Colonoscopy Prep was Sent?: N/A  Procedure Scheduled: EGD  Provider/Surgeon: Dr. Montserrat Magallanes  Date of Procedure: 10/14/21  Location: [facility]  Caller (Please ask for phone number if not scheduled by patient): Shasha      Sedation Type: CS  Conscious Sedation- Needs  for 6 hours after the procedure  MAC/General-Needs  for 24 hours after  procedure    Pre-op Required at [facility], [place], [place] and OR for MAC sedation:   (if yes advise patient they will need a pre-op prior to procedure)      Is patient on blood thinners? -no (If yes- inform patient to follow up with PCP or provider for follow up instructions)     Informed patient they will need an adult  yes  Cannot take any type of public or medical transportation alone    Informed Patient of COVID Test Requirement yes    Confirmed Nurse will call to complete assessment yes    Additional comments: n/a

## 2021-09-12 ENCOUNTER — HEALTH MAINTENANCE LETTER (OUTPATIENT)
Age: 66
End: 2021-09-12

## 2021-09-13 DIAGNOSIS — Z11.59 ENCOUNTER FOR SCREENING FOR OTHER VIRAL DISEASES: ICD-10-CM

## 2021-10-06 ENCOUNTER — TELEPHONE (OUTPATIENT)
Dept: GASTROENTEROLOGY | Facility: CLINIC | Age: 66
End: 2021-10-06

## 2021-10-06 NOTE — TELEPHONE ENCOUNTER
Attempted to contact patient regarding upcoming EGD procedure on 10.14.2021 for pre assessment questions. No answer.     Left message to return call to [phone] #2    Covid test scheduled: 10.11.2021    Arrival time: 1230    Facility location: [facility]    Sedation type: RUBEN Ortiz RN

## 2021-10-06 NOTE — TELEPHONE ENCOUNTER
Writer reviewed pre-assessment questions with patient prior to upcoming EGD on 10.14.2021.      Covid test scheduled: 10.11.2021    Arrival time: 1230    Facility location: [facility]    Sedation type: CS    Electronic Implantable devices? No    Blood thinners/Antiplatelet medication? No    Reviewed EGD prep instructions with patient.      Designated  policy reviewed with patient.     Patient verbalized understanding.  No further questions or concerns.    Kaylan Ortiz RN

## 2021-10-08 DIAGNOSIS — K74.3 PRIMARY BILIARY CHOLANGITIS (H): ICD-10-CM

## 2021-10-08 RX ORDER — URSODIOL 250 MG/1
TABLET, FILM COATED ORAL
Qty: 360 TABLET | Refills: 3 | Status: SHIPPED | OUTPATIENT
Start: 2021-10-08 | End: 2022-03-25

## 2021-10-11 ENCOUNTER — LAB (OUTPATIENT)
Dept: LAB | Facility: CLINIC | Age: 66
End: 2021-10-11
Payer: COMMERCIAL

## 2021-10-11 ENCOUNTER — LAB (OUTPATIENT)
Dept: LAB | Facility: CLINIC | Age: 66
End: 2021-10-11
Attending: INTERNAL MEDICINE

## 2021-10-11 ENCOUNTER — DOCUMENTATION ONLY (OUTPATIENT)
Dept: GASTROENTEROLOGY | Facility: CLINIC | Age: 66
End: 2021-10-11

## 2021-10-11 DIAGNOSIS — Z11.59 ENCOUNTER FOR SCREENING FOR OTHER VIRAL DISEASES: ICD-10-CM

## 2021-10-11 DIAGNOSIS — K74.3 PRIMARY BILIARY CHOLANGITIS (H): ICD-10-CM

## 2021-10-11 LAB
ALBUMIN SERPL-MCNC: 3.6 G/DL (ref 3.5–5)
ALP SERPL-CCNC: 185 U/L (ref 45–120)
ALT SERPL W P-5'-P-CCNC: 21 U/L (ref 0–45)
ANION GAP SERPL CALCULATED.3IONS-SCNC: 8 MMOL/L (ref 5–18)
AST SERPL W P-5'-P-CCNC: 24 U/L (ref 0–40)
BILIRUB DIRECT SERPL-MCNC: 0.4 MG/DL
BILIRUB SERPL-MCNC: 1.4 MG/DL (ref 0–1)
BUN SERPL-MCNC: 9 MG/DL (ref 8–22)
CALCIUM SERPL-MCNC: 10 MG/DL (ref 8.5–10.5)
CHLORIDE BLD-SCNC: 103 MMOL/L (ref 98–107)
CO2 SERPL-SCNC: 27 MMOL/L (ref 22–31)
CREAT SERPL-MCNC: 0.69 MG/DL (ref 0.6–1.1)
ERYTHROCYTE [DISTWIDTH] IN BLOOD BY AUTOMATED COUNT: 13.2 % (ref 10–15)
GFR SERPL CREATININE-BSD FRML MDRD: >90 ML/MIN/1.73M2
GLUCOSE BLD-MCNC: 91 MG/DL (ref 70–125)
HCT VFR BLD AUTO: 41.9 % (ref 35–47)
HGB BLD-MCNC: 13.8 G/DL (ref 11.7–15.7)
INR PPP: 1.05 (ref 0.85–1.15)
MCH RBC QN AUTO: 30.2 PG (ref 26.5–33)
MCHC RBC AUTO-ENTMCNC: 32.9 G/DL (ref 31.5–36.5)
MCV RBC AUTO: 92 FL (ref 78–100)
PLATELET # BLD AUTO: 41 10E3/UL (ref 150–450)
POTASSIUM BLD-SCNC: 4.8 MMOL/L (ref 3.5–5)
PROT SERPL-MCNC: 7.6 G/DL (ref 6–8)
RBC # BLD AUTO: 4.57 10E6/UL (ref 3.8–5.2)
SARS-COV-2 RNA RESP QL NAA+PROBE: NEGATIVE
SODIUM SERPL-SCNC: 138 MMOL/L (ref 136–145)
WBC # BLD AUTO: 5.1 10E3/UL (ref 4–11)

## 2021-10-11 PROCEDURE — 82248 BILIRUBIN DIRECT: CPT | Mod: 59

## 2021-10-11 PROCEDURE — U0003 INFECTIOUS AGENT DETECTION BY NUCLEIC ACID (DNA OR RNA); SEVERE ACUTE RESPIRATORY SYNDROME CORONAVIRUS 2 (SARS-COV-2) (CORONAVIRUS DISEASE [COVID-19]), AMPLIFIED PROBE TECHNIQUE, MAKING USE OF HIGH THROUGHPUT TECHNOLOGIES AS DESCRIBED BY CMS-2020-01-R: HCPCS

## 2021-10-11 PROCEDURE — 36415 COLL VENOUS BLD VENIPUNCTURE: CPT

## 2021-10-11 PROCEDURE — 85610 PROTHROMBIN TIME: CPT

## 2021-10-11 PROCEDURE — 85027 COMPLETE CBC AUTOMATED: CPT

## 2021-10-11 PROCEDURE — 80053 COMPREHEN METABOLIC PANEL: CPT

## 2021-10-11 PROCEDURE — U0005 INFEC AGEN DETEC AMPLI PROBE: HCPCS

## 2021-10-11 NOTE — PROGRESS NOTES
DATE:  10/11/2021   TIME OF RECEIPT FROM LAB:  1212  LAB TEST:  Platelets   LAB VALUE:  41  Consistent with previous. No action required.    Izabel PAPPAS LPN  Hepatology Clinic

## 2021-10-14 ENCOUNTER — HOSPITAL ENCOUNTER (OUTPATIENT)
Facility: CLINIC | Age: 66
Discharge: HOME | End: 2021-10-14
Attending: INTERNAL MEDICINE | Admitting: INTERNAL MEDICINE
Payer: COMMERCIAL

## 2021-10-14 VITALS
RESPIRATION RATE: 23 BRPM | BODY MASS INDEX: 21.23 KG/M2 | TEMPERATURE: 98.8 F | HEART RATE: 62 BPM | HEIGHT: 64 IN | SYSTOLIC BLOOD PRESSURE: 164 MMHG | WEIGHT: 124.34 LBS | DIASTOLIC BLOOD PRESSURE: 95 MMHG | OXYGEN SATURATION: 100 %

## 2021-10-14 LAB — UPPER GI ENDOSCOPY: NORMAL

## 2021-10-14 PROCEDURE — 250N000013 HC RX MED GY IP 250 OP 250 PS 637: Performed by: INTERNAL MEDICINE

## 2021-10-14 PROCEDURE — 43235 EGD DIAGNOSTIC BRUSH WASH: CPT | Performed by: INTERNAL MEDICINE

## 2021-10-14 RX ORDER — PROCHLORPERAZINE MALEATE 10 MG
10 TABLET ORAL EVERY 6 HOURS PRN
Status: DISCONTINUED | OUTPATIENT
Start: 2021-10-14 | End: 2021-10-14 | Stop reason: HOSPADM

## 2021-10-14 RX ORDER — ONDANSETRON 2 MG/ML
4 INJECTION INTRAMUSCULAR; INTRAVENOUS
Status: CANCELLED | OUTPATIENT
Start: 2021-10-14

## 2021-10-14 RX ORDER — NALOXONE HYDROCHLORIDE 0.4 MG/ML
0.4 INJECTION, SOLUTION INTRAMUSCULAR; INTRAVENOUS; SUBCUTANEOUS
Status: DISCONTINUED | OUTPATIENT
Start: 2021-10-14 | End: 2021-10-14 | Stop reason: HOSPADM

## 2021-10-14 RX ORDER — LIDOCAINE 40 MG/G
CREAM TOPICAL
Status: CANCELLED | OUTPATIENT
Start: 2021-10-14

## 2021-10-14 RX ORDER — NALOXONE HYDROCHLORIDE 0.4 MG/ML
0.2 INJECTION, SOLUTION INTRAMUSCULAR; INTRAVENOUS; SUBCUTANEOUS
Status: DISCONTINUED | OUTPATIENT
Start: 2021-10-14 | End: 2021-10-14 | Stop reason: HOSPADM

## 2021-10-14 RX ORDER — ONDANSETRON 2 MG/ML
4 INJECTION INTRAMUSCULAR; INTRAVENOUS EVERY 6 HOURS PRN
Status: DISCONTINUED | OUTPATIENT
Start: 2021-10-14 | End: 2021-10-14 | Stop reason: HOSPADM

## 2021-10-14 RX ORDER — FLUMAZENIL 0.1 MG/ML
0.2 INJECTION, SOLUTION INTRAVENOUS
Status: DISCONTINUED | OUTPATIENT
Start: 2021-10-14 | End: 2021-10-14 | Stop reason: HOSPADM

## 2021-10-14 RX ORDER — SIMETHICONE 40MG/0.6ML
SUSPENSION, DROPS(FINAL DOSAGE FORM)(ML) ORAL PRN
Status: COMPLETED | OUTPATIENT
Start: 2021-10-14 | End: 2021-10-14

## 2021-10-14 RX ORDER — ONDANSETRON 4 MG/1
4 TABLET, ORALLY DISINTEGRATING ORAL EVERY 6 HOURS PRN
Status: DISCONTINUED | OUTPATIENT
Start: 2021-10-14 | End: 2021-10-14 | Stop reason: HOSPADM

## 2021-10-14 RX ADMIN — SIMETHICONE 133 MG: 20 SUSPENSION/ DROPS ORAL at 13:18

## 2021-10-14 ASSESSMENT — MIFFLIN-ST. JEOR: SCORE: 1099

## 2021-10-14 NOTE — PROGRESS NOTES
Spoke with Dr. Brooks to update on patient's elevated BP.  Per Dr. Brooks, ok for patient to discharge home as long as she is asymptomatic with high BP.  Patient reports having BP cuff at home to monitor.  Denies chest pain, headache, dizziness.

## 2021-10-14 NOTE — OR NURSING
Pt had EGD, no sedation, no endoscopic interventions. Pt stable at time of transfer to 3C recovery. Report given to 3C RN.

## 2021-10-14 NOTE — DISCHARGE INSTRUCTIONS
Discharge Instructions after Endoscopic Ultrasound    Activity  You were given medicine for pain. You may be dizzy or sleepy.    For 24 hours:    Do not drive or use heavy equipment.    Do not make important decisions.    Do not drink any alcohol.    Diet  Wait one hour before eating or drinking. Start with sips of water. When your gag reflex has returned you  may go back to your usual diet, medicines and light exercise.    Discomfort    Some bloating is normal. You may have large burps or pass air.    You may have a sore throat for 2 to 3 days. It may help to:    Avoid hot liquids for 24 hours.    Use sore throat lozenges.    Gargle as needed with salt water up to 4 times a day. Mix 1 cup of warm water with 1 teaspoon of salt. Do not swallow.    You may take Tylenol (acetaminophen) for pain unless your doctor has told you not to.      Follow-up  -see Dr. Magallanes as scheduled     When to call    Call right away if you have:    Severe throat pain or trouble swallowing    Black stools (tar-like looking bowel movement)    Fever above 100.6 F (37.5 C)    Unusual pain in belly or chest not relieved by belching or passing air.    If you vomit blood or have severe pain, go to an emergency room.    If you have questions, call    Monday to Friday, 8 a.m. to 4:30 p.m.:   Central Scheduling Department: [phone]  After hours: Hospital: [phone] (Ask for the GI fellow on call)

## 2021-11-07 ENCOUNTER — HEALTH MAINTENANCE LETTER (OUTPATIENT)
Age: 66
End: 2021-11-07

## 2021-11-18 ENCOUNTER — IMMUNIZATION (OUTPATIENT)
Dept: NURSING | Facility: CLINIC | Age: 66
End: 2021-11-18
Payer: COMMERCIAL

## 2021-11-18 PROCEDURE — 0003A PR COVID VAC PFIZER DIL RECON 30 MCG/0.3 ML IM: CPT

## 2021-11-18 PROCEDURE — 91300 PR COVID VAC PFIZER DIL RECON 30 MCG/0.3 ML IM: CPT

## 2022-01-02 ENCOUNTER — HEALTH MAINTENANCE LETTER (OUTPATIENT)
Age: 67
End: 2022-01-02

## 2022-02-15 ENCOUNTER — TELEPHONE (OUTPATIENT)
Dept: GASTROENTEROLOGY | Facility: CLINIC | Age: 67
End: 2022-02-15
Payer: COMMERCIAL

## 2022-02-15 DIAGNOSIS — K74.3 PRIMARY BILIARY CHOLANGITIS (H): Primary | ICD-10-CM

## 2022-02-15 NOTE — TELEPHONE ENCOUNTER
Lab orders placed. Patient notified.    Jacquelyn PAPPAS LPN  Hepatology Clinic     [facility]    Phone Message    May a detailed message be left on voicemail: yes     Reason for Call: Order(s): Other:   Reason for requested: Lab orders   Date needed: ASAP to make lab appt  Provider name: Dr. Montserrat Magallanes    NOTE:  Send [employer]â€™st message to patient when orders are in so she can schedule appt.      Action Taken: Message routed to:  [facility] ([facility]): [facility]at[facility]    Travel Screening: Not Applicable

## 2022-03-18 ENCOUNTER — LAB (OUTPATIENT)
Dept: LAB | Facility: CLINIC | Age: 67
End: 2022-03-18

## 2022-03-18 DIAGNOSIS — K74.3 PRIMARY BILIARY CHOLANGITIS (H): ICD-10-CM

## 2022-03-18 LAB
ALBUMIN SERPL-MCNC: 3.6 G/DL (ref 3.5–5)
ALP SERPL-CCNC: 112 U/L (ref 45–120)
ALT SERPL W P-5'-P-CCNC: 18 U/L (ref 0–45)
ANION GAP SERPL CALCULATED.3IONS-SCNC: 8 MMOL/L (ref 5–18)
AST SERPL W P-5'-P-CCNC: 24 U/L (ref 0–40)
BILIRUB DIRECT SERPL-MCNC: 0.4 MG/DL
BILIRUB SERPL-MCNC: 1.1 MG/DL (ref 0–1)
BUN SERPL-MCNC: 13 MG/DL (ref 8–22)
CALCIUM SERPL-MCNC: 9.5 MG/DL (ref 8.5–10.5)
CHLORIDE BLD-SCNC: 103 MMOL/L (ref 98–107)
CO2 SERPL-SCNC: 28 MMOL/L (ref 22–31)
CREAT SERPL-MCNC: 0.72 MG/DL (ref 0.6–1.1)
ERYTHROCYTE [DISTWIDTH] IN BLOOD BY AUTOMATED COUNT: 13.1 % (ref 10–15)
GFR SERPL CREATININE-BSD FRML MDRD: >90 ML/MIN/1.73M2
GLUCOSE BLD-MCNC: 85 MG/DL (ref 70–125)
HCT VFR BLD AUTO: 40.2 % (ref 35–47)
HGB BLD-MCNC: 13.3 G/DL (ref 11.7–15.7)
INR PPP: 1.01 (ref 0.85–1.15)
MCH RBC QN AUTO: 30.4 PG (ref 26.5–33)
MCHC RBC AUTO-ENTMCNC: 33.1 G/DL (ref 31.5–36.5)
MCV RBC AUTO: 92 FL (ref 78–100)
PLATELET # BLD AUTO: 148 10E3/UL (ref 150–450)
POTASSIUM BLD-SCNC: 4.9 MMOL/L (ref 3.5–5)
PROT SERPL-MCNC: 7.1 G/DL (ref 6–8)
RBC # BLD AUTO: 4.37 10E6/UL (ref 3.8–5.2)
SODIUM SERPL-SCNC: 139 MMOL/L (ref 136–145)
WBC # BLD AUTO: 4.8 10E3/UL (ref 4–11)

## 2022-03-18 PROCEDURE — 82248 BILIRUBIN DIRECT: CPT

## 2022-03-18 PROCEDURE — 36415 COLL VENOUS BLD VENIPUNCTURE: CPT

## 2022-03-18 PROCEDURE — 85027 COMPLETE CBC AUTOMATED: CPT

## 2022-03-18 PROCEDURE — 80053 COMPREHEN METABOLIC PANEL: CPT

## 2022-03-18 PROCEDURE — 85610 PROTHROMBIN TIME: CPT

## 2022-03-24 NOTE — PROGRESS NOTES
[facility]  LIVER CLINIC FOLLOW UP  VIDEO VISIT    A/P  Ms. Andrews is a 66 Y F with PBC diagnosed 2010 On phoenix since with normal liver tests. Fibrosis scan F2 in 2018.  Today we reviewed all of the labs, the diagnosis, treatment and sequelae related to having PBC, including risk of cirrhosis, decline in liver function, portal hypertension, low bone density, and HCC risk.    PBC Continue on phoenix. Renewed. Labs every 6 months. Ordered  Variceal screening EGD 10/14/21. No varices. Due 2024.  Bone density osteopenia on DEXA 2019. Due 2022.   HCC screening US 3/9/21. Ordered  Liver function normal LFTs. Labs every 6 months. Ordered  Thrombocytopenia Improved from 40s to 140.  Alcohol use stopped 7/6/17. Encouraged her with respect to the importance of this.  CRC screening Colonoscopy 1/31/18. Repeat 2025    RTC 1 year.  =========================================  Subjective: Ms. Andrews 66 Y F with PBC. Initial diagnosis of PBC was in 2010 at [place]. She had an elevated AMA (we do not have value) and a normal MRE. Alk phos was over 400 at that time. AMA in 2014 was 153.5. She was started on phoenix in 2010. She takes 250 mg QID. She prefers QID dosing.     Other than thrombocytopenia, her liver tests and other labs have been normal.     Fibrosis scan in 2018 F2.    Quit alcohol 7/6/17.   Taking some supplements like papaya and wheat grass and has done for years.    Last EGD: 2018   Last HCC screening: US 3/9/20  DEXA:2019 Osteopenia per pt report. On vit D and Ca.     SOC: Sold their house and are moving to a house they built, 4 miles away.     Lab Test 03/18/22  1021   PROTTOTAL 7.1   ALBUMIN 3.6   BILITOTAL 1.1*   ALKPHOS 112   AST 24   ALT 18     Lab Test 03/18/22  1021   WBC 4.8   RBC 4.37   HGB 13.3   HCT 40.2   MCV 92   MCH 30.4   MCHC 33.1   RDW 13.1   *       Current Outpatient Medications   Medication     Ascorbic Acid (VITAMIN C PO)     calcium carbonate-vitamin D 600-400 MG-UNIT CHEW      "Coenzyme Q10 (CO Q 10 PO)     GLUCOSAMINE SULFATE PO     glucosamine-chondroitin 500-400 MG CAPS per capsule     Omega-3 Fatty Acids (RA FISH OIL) 1000 MG CAPS     ursodiol (ACTIGALL) 250 MG tablet     vitamin B complex with vitamin C (VITAMIN  B COMPLEX) TABS tablet     Vitamin D, Cholecalciferol, 1000 units CAPS     VITAMIN E COMPLEX PO     No current facility-administered medications for this visit.   Probiotic  Zinc 50 mg  Papaya enzymes prn stomach upset  Collagen peptide for 2 weeks    Exam  Gen Alert pleasant NAD  Resp No difficulty breathing. No cough  Skin No Jaundice  Eyes No icterus  Neuro GARZON  MSK no muscle wasting  Psyche Pleasant, appropriate. Well groomed.  Shasha Andrews is a 65 year old female who is being evaluated via a billable video visit.      The patient has been notified of following:   \"This video visit will be conducted via a call between you and your physician/provider. We have found that certain health care needs can be provided without the need for an in-person physical exam.  This service lets us provide the care you need with a video conversation.  If a prescription is necessary we can send it directly to your pharmacy.  If lab work is needed we can place an order for that and you can then stop by our lab to have the test done at a later time. Video visits are billed at different rates depending on your insurance coverage.  Please reach out to your insurance provider with any questions.  If during the course of the call the physician/provider feels a video visit is not appropriate, you will not be charged for this service.\"   Patient has given verbal consent for Video visit? Yes    Type of service:  Video Visit  Video Start Time: 1009  Video End Time 1026  Patient location: home  Will anyone else be joining your video visit? No  {If patient encounters technical issues they should call [phone] :1  Distant Location (provider location):  [facility] HEPATOLOGY CLINIC " [place]   Mode of Communication:  Video Conference via Metafor Software  I have reviewed and updated the patient's Past Medical History, Social History, Family History and Medication List.

## 2022-03-25 ENCOUNTER — VIRTUAL VISIT (OUTPATIENT)
Dept: GASTROENTEROLOGY | Facility: CLINIC | Age: 67
End: 2022-03-25
Attending: INTERNAL MEDICINE
Payer: COMMERCIAL

## 2022-03-25 DIAGNOSIS — M85.88 OTHER SPECIFIED DISORDERS OF BONE DENSITY AND STRUCTURE, OTHER SITE: ICD-10-CM

## 2022-03-25 DIAGNOSIS — K74.3 PRIMARY BILIARY CHOLANGITIS (H): Primary | ICD-10-CM

## 2022-03-25 PROCEDURE — 99214 OFFICE O/P EST MOD 30 MIN: CPT | Mod: 95 | Performed by: INTERNAL MEDICINE

## 2022-03-25 RX ORDER — URSODIOL 250 MG/1
250 TABLET, FILM COATED ORAL 4 TIMES DAILY
Qty: 360 TABLET | Refills: 3 | Status: SHIPPED | OUTPATIENT
Start: 2022-03-25 | End: 2022-10-31

## 2022-03-25 ASSESSMENT — PAIN SCALES - GENERAL: PAINLEVEL: NO PAIN (0)

## 2022-03-25 NOTE — PROGRESS NOTES
"Shasha is a 66 year old who is being evaluated via a billable video visit.      How would you like to obtain your AVS? MyChart  If the video visit is dropped, the invitation should be resent by: Text to cell phone: [phone]  Will anyone else be joining your video visit? No  {If patient encounters technical issues they should call [phone] :[id number]}    Video Start Time: {video visit start/end time for provider to select:152948}  Video-Visit Details    Type of service:  Video Visit    Video End Time:{video visit start/end time for provider to select:152948}    Originating Location (pt. Location): {video visit patient location:303404::\"Home\"}    Distant Location (provider location):  [facility] HEPATOLOGY CLIN[facility] [place]     Platform used for Video Visit: {Virtual Visit Platforms:190100::\"Bridgefy\"}    "

## 2022-03-25 NOTE — LETTER
3/25/2022     RE: Shasha Andrews  [address] [address]    Dear Colleague,    Thank you for referring your patient, Shahsa Andrews, to the [facility] HEPATOLOGY CLINIC [place]. Please see a copy of my visit note below.    [facility]  LIVER CLINIC FOLLOW UP  VIDEO VISIT    A/P  Ms. Andrews is a 66 Y F with PBC diagnosed 2010 On phoenix since with normal liver tests. Fibrosis scan F2 in 2018.  Today we reviewed all of the labs, the diagnosis, treatment and sequelae related to having PBC, including risk of cirrhosis, decline in liver function, portal hypertension, low bone density, and HCC risk.    PBC Continue on phoenix. Renewed. Labs every 6 months. Ordered  Variceal screening EGD 10/14/21. No varices. Due 2024.  Bone density osteopenia on DEXA 2019. Due 2022.   HCC screening US 3/9/21. Ordered  Liver function normal LFTs. Labs every 6 months. Ordered  Thrombocytopenia Improved from 40s to 140.  Alcohol use stopped 7/6/17. Encouraged her with respect to the importance of this.  CRC screening Colonoscopy 1/31/18. Repeat 2025    RTC 1 year.  =========================================  Subjective: Ms. Andrews 66 Y F with PBC. Initial diagnosis of PBC was in 2010 at [place]. She had an elevated AMA (we do not have value) and a normal MRE. Alk phos was over 400 at that time. AMA in 2014 was 153.5. She was started on phoenix in 2010. She takes 250 mg QID. She prefers QID dosing.     Other than thrombocytopenia, her liver tests and other labs have been normal.     Fibrosis scan in 2018 F2.    Quit alcohol 7/6/17.   Taking some supplements like papaya and wheat grass and has done for years.    Last EGD: 2018   Last HCC screening: US 3/9/20  DEXA:2019 Osteopenia per pt report. On vit D and Ca.     SOC: Sold their house and are moving to a house they built, 4 miles away.     Lab Test 03/18/22  1021   PROTTOTAL 7.1   ALBUMIN 3.6   BILITOTAL 1.1*   ALKPHOS 112   AST 24   ALT 18     Lab Test  "03/18/22  1021   WBC 4.8   RBC 4.37   HGB 13.3   HCT 40.2   MCV 92   MCH 30.4   MCHC 33.1   RDW 13.1   *       Current Outpatient Medications   Medication     Ascorbic Acid (VITAMIN C PO)     calcium carbonate-vitamin D 600-400 MG-UNIT CHEW     Coenzyme Q10 (CO Q 10 PO)     GLUCOSAMINE SULFATE PO     glucosamine-chondroitin 500-400 MG CAPS per capsule     Omega-3 Fatty Acids (RA FISH OIL) 1000 MG CAPS     ursodiol (ACTIGALL) 250 MG tablet     vitamin B complex with vitamin C (VITAMIN  B COMPLEX) TABS tablet     Vitamin D, Cholecalciferol, 1000 units CAPS     VITAMIN E COMPLEX PO     No current facility-administered medications for this visit.   Probiotic  Zinc 50 mg  Papaya enzymes prn stomach upset  Collagen peptide for 2 weeks    Exam  Gen Alert pleasant NAD  Resp No difficulty breathing. No cough  Skin No Jaundice  Eyes No icterus  Neuro GARZON  MSK no muscle wasting  Psyche Pleasant, appropriate. Well groomed.  Shasha Andrews is a 65 year old female who is being evaluated via a billable video visit.      The patient has been notified of following:   \"This video visit will be conducted via a call between you and your physician/provider. We have found that certain health care needs can be provided without the need for an in-person physical exam.  This service lets us provide the care you need with a video conversation.  If a prescription is necessary we can send it directly to your pharmacy.  If lab work is needed we can place an order for that and you can then stop by our lab to have the test done at a later time. Video visits are billed at different rates depending on your insurance coverage.  Please reach out to your insurance provider with any questions.  If during the course of the call the physician/provider feels a video visit is not appropriate, you will not be charged for this service.\"   Patient has given verbal consent for Video visit? Yes    Type of service:  Video Visit  Video Start Time: " 1009  Video End Time 1026  Patient location: home  Will anyone else be joining your video visit? No  {If patient encounters technical issues they should call [phone] :1  Distant Location (provider location):  [facility] HEPATOLOGY CLINIC [place]   Mode of Communication:  Video Conference via Dynamic Recreation  I have reviewed and updated the patient's Past Medical History, Social History, Family History and Medication List.    Again, thank you for allowing me to participate in the care of your patient.      Sincerely,    Montserrat Magallanes MD

## 2022-04-04 ENCOUNTER — TELEPHONE (OUTPATIENT)
Dept: GASTROENTEROLOGY | Facility: CLINIC | Age: 67
End: 2022-04-04
Payer: COMMERCIAL

## 2022-04-04 NOTE — TELEPHONE ENCOUNTER
Ultrasound and DEXA scan orders faxed to [facility].  Patient notified via Hello Agent.    Jacquelyn PAPPAS LPN  Hepatology Clinic      Health Call Center    Phone Message    May a detailed message be left on voicemail: yes     Reason for Call: Order(s): Other:   Reason for requested: Ultra Sound & Dexa  Date needed: ASAP  Provider name: Taiwo CHAPIN    Pt stated that they will need to have their orders for their Dexa scan and ultrasound faxed to the [facility] where she will have them preformed.    Pt has requested a call back or a App Partner message confirming they have been sent.      Action Taken: Message routed to:  [facility] Hep    Travel Screening: Not Applicable

## 2022-04-14 ENCOUNTER — TELEPHONE (OUTPATIENT)
Dept: GASTROENTEROLOGY | Facility: CLINIC | Age: 67
End: 2022-04-14
Payer: COMMERCIAL

## 2022-04-14 DIAGNOSIS — K74.3 PRIMARY BILIARY CHOLANGITIS (H): Primary | ICD-10-CM

## 2022-04-14 NOTE — TELEPHONE ENCOUNTER
Patient called stating had DEXA scan 4/8/22 (in Care Everywhere) and was found to have osteoporosis.  Bought OTC Citrucel SR 1200 two capsules a day.   States that in the past, her Ursodiol prescription bottle warned of taking Citrucel with ursodiol.  wants to now Dr. Magallanes's thoughts on this.     Jacquelyn PAPPAS LPN  Hepatology Clinic     [facility]    Phone Message    May a detailed message be left on voicemail: yes     Reason for Call: Other:     Shasha is calling in regards to labs that were completed at [facility], she is asking if they have been looked at by Dr Magallanes. She states that she also has questions on taking calcium.  Please call to discuss.    Action Taken: Message routed to:  [facility] hep    Travel Screening: Not Applicable

## 2022-04-19 NOTE — TELEPHONE ENCOUNTER
[facility] Call Center    Phone Message    May a detailed message be left on voicemail: yes     Reason for Call: Other:      Pt is following up on her previous question in regards to Citrucel, and is requesting a call back. She also stated that the clinic can message her via ATOMOO.    Pt is also wondering what options might be available if it is advised she not take the Citrucel.    She stated that she has been waiting for an answer.    Action Taken: Message routed to:  [facility] Hep    Travel Screening: Not Applicable

## 2022-04-22 ENCOUNTER — TELEPHONE (OUTPATIENT)
Dept: GASTROENTEROLOGY | Facility: CLINIC | Age: 67
End: 2022-04-22
Payer: COMMERCIAL

## 2022-04-22 NOTE — TELEPHONE ENCOUNTER
Can you let them know US looks good - stable liver disease without a mass, we will repeat in 6 months

## 2022-04-22 NOTE — TELEPHONE ENCOUNTER
[facility] Call Center    Phone Message    May a detailed message be left on voicemail: yes     Reason for Call: Other: Pt [facility] called and wanted to know if both images and results needed to be sent over, writer did provide fax. Please reach out.     Action Taken: Message routed to:  [facility] hep    Travel Screening: Not Applicable

## 2022-06-19 ENCOUNTER — HEALTH MAINTENANCE LETTER (OUTPATIENT)
Age: 67
End: 2022-06-19

## 2022-10-03 ENCOUNTER — TELEPHONE (OUTPATIENT)
Dept: FAMILY MEDICINE | Facility: CLINIC | Age: 67
End: 2022-10-03

## 2022-10-03 NOTE — TELEPHONE ENCOUNTER
Reason for Call:  Other call back    Detailed comments: PATIENT HAS QUESTIONS ABOUT HER UPCOMING LABS AND WOULD LIKE A CALL BACK    Phone Number Patient can be reached at: Cell number on file:    Telephone Information:   Mobile [phone]       Best Time: ASAP    Can we leave a detailed message on this number? YES    Call taken on 10/3/2022 at 9:07 AM by Sharon Pratt

## 2022-10-04 ENCOUNTER — TELEPHONE (OUTPATIENT)
Dept: GASTROENTEROLOGY | Facility: CLINIC | Age: 67
End: 2022-10-04

## 2022-10-04 NOTE — TELEPHONE ENCOUNTER
Patient scheduled US at [facility].  Ultrasound orders not needed at [facility].      Jacquelyn PAPPAS LPN  Hepatology Clinic       Health Call Center    Phone Message    May a detailed message be left on voicemail: yes     Reason for Call: Other: [facility] calling with questions regarding US orders in chart, they were told by Pt that she needs to have these abdomen US every 6 months at [facility], they do not have any orders for her there and are confused, please call them back to discuss this further and if she needs to have this done at [facility] or if it should be [facility], thanks     Action Taken: Other: Hep    Travel Screening: Not Applicable

## 2022-10-11 ENCOUNTER — TELEPHONE (OUTPATIENT)
Dept: GASTROENTEROLOGY | Facility: CLINIC | Age: 67
End: 2022-10-11

## 2022-10-11 NOTE — TELEPHONE ENCOUNTER
Informed patient she will need to reach out to [facility] to fax prior auth form for Dr. Magallanes to fill out.  Fax number given to patient.    Jacquelyn PAPPAS LPN  Hepatology Clinic     [facility]    Phone Message    May a detailed message be left on voicemail: yes     Reason for Call: Other: Pt called to inform that her insurance [facility] only covers 1 ultrasound a year. Dr Magallanes requests 2 ultrasounds a year. The insurance needs a prior authorization for a second ultrasound. Pt would like to keep the appt for the ultrasound she has scheduled on 10/27. Please send a prior authorization to pt's insurance company for second ultrasound, thank you.    Action Taken: Other: Hepa    Travel Screening: Not Applicable

## 2022-10-13 NOTE — TELEPHONE ENCOUNTER
Writer called [facility] PAC Line and confirmed PA not needed for twice yearly abdominal ultrasound limited. Patient notified.    Jacquelyn PAPPAS LPN  Hepatology Clinic     [facility]    Phone Message    May a detailed message be left on voicemail: yes     Reason for Call: Other: Patient was calling in again because she spoke to Jacquelyn about reaching out to [facility] for a faxed form for a pre authorization for a second ultrasound, but the patient explained that [facility] told her that they will not fax a form and that the team here will have to call the PAC line in order to pre authorize. The patient then explained that [facility] would not provide her a number for the PAC line, and that they said [facility] should already know the number. Please call patient at [phone] to discuss, thank you!     Action Taken: Message routed to:  [facility] HEP    Travel Screening: Not Applicable

## 2022-10-24 ENCOUNTER — TELEPHONE (OUTPATIENT)
Dept: GASTROENTEROLOGY | Facility: CLINIC | Age: 67
End: 2022-10-24

## 2022-10-24 NOTE — TELEPHONE ENCOUNTER
Prior Authorization Retail Medication Request    Medication/Dose:   ICD code (if different than what is on RX):      Previously Tried and Failed:    Rationale:      Insurance Name:    Insurance ID:        Pharmacy Information (if different than what is on RX)  Name:    Phone:

## 2022-10-25 NOTE — TELEPHONE ENCOUNTER
Central Prior Authorization Team   Phone: [phone]    PA Initiation    Medication: Ursodiol 250 mg tab-(TIER EXCEPTION)    Insurance Company: [employer] - Phone [phone] Fax [phone]  Pharmacy Filling the Rx: [facility] - [place], [place] - [address] [place] [address]  Filling Pharmacy Phone: [phone]  Filling Pharmacy Fax: [phone]  Start Date: 10/25/2022    Initiate PA Tier Exception by phone [phone] Case # [id number]

## 2022-10-25 NOTE — TELEPHONE ENCOUNTER
PRIOR AUTHORIZATION DENIED    Medication: Ursodiol 250 mg tab-(TIER EXCEPTION) -PA DENIED     Denial Date: 10/25/2022    Denial Rational: Medication is currently at Tier 2 per Rep Alexandra. Last paid claim on medication was on 7/8/2022 quantity 360 for 90 day supply at [facility] - [place], [place] - [address] [place] [address] pharmacy.        Appeal Information:

## 2022-10-26 ENCOUNTER — LAB (OUTPATIENT)
Dept: LAB | Facility: CLINIC | Age: 67
End: 2022-10-26
Payer: COMMERCIAL

## 2022-10-26 DIAGNOSIS — K74.3 PRIMARY BILIARY CHOLANGITIS (H): ICD-10-CM

## 2022-10-26 LAB
ALBUMIN SERPL BCG-MCNC: 4.1 G/DL (ref 3.5–5.2)
ALP SERPL-CCNC: 109 U/L (ref 35–104)
ALT SERPL W P-5'-P-CCNC: 19 U/L (ref 10–35)
ANION GAP SERPL CALCULATED.3IONS-SCNC: 9 MMOL/L (ref 7–15)
AST SERPL W P-5'-P-CCNC: 27 U/L (ref 10–35)
BILIRUB DIRECT SERPL-MCNC: 0.22 MG/DL (ref 0–0.3)
BILIRUB SERPL-MCNC: 0.7 MG/DL
BUN SERPL-MCNC: 15.7 MG/DL (ref 8–23)
CALCIUM SERPL-MCNC: 10.3 MG/DL (ref 8.8–10.2)
CHLORIDE SERPL-SCNC: 104 MMOL/L (ref 98–107)
CREAT SERPL-MCNC: 0.75 MG/DL (ref 0.51–0.95)
DEPRECATED HCO3 PLAS-SCNC: 27 MMOL/L (ref 22–29)
ERYTHROCYTE [DISTWIDTH] IN BLOOD BY AUTOMATED COUNT: 12.9 % (ref 10–15)
GFR SERPL CREATININE-BSD FRML MDRD: 88 ML/MIN/1.73M2
GLUCOSE SERPL-MCNC: 88 MG/DL (ref 70–99)
HCT VFR BLD AUTO: 39.8 % (ref 35–47)
HGB BLD-MCNC: 13.5 G/DL (ref 11.7–15.7)
INR PPP: 1.01 (ref 0.85–1.15)
MCH RBC QN AUTO: 30.9 PG (ref 26.5–33)
MCHC RBC AUTO-ENTMCNC: 33.9 G/DL (ref 31.5–36.5)
MCV RBC AUTO: 91 FL (ref 78–100)
PLATELET # BLD AUTO: 137 10E3/UL (ref 150–450)
POTASSIUM SERPL-SCNC: 4.9 MMOL/L (ref 3.4–5.3)
PROT SERPL-MCNC: 7.3 G/DL (ref 6.4–8.3)
RBC # BLD AUTO: 4.37 10E6/UL (ref 3.8–5.2)
SODIUM SERPL-SCNC: 140 MMOL/L (ref 136–145)
WBC # BLD AUTO: 5.1 10E3/UL (ref 4–11)

## 2022-10-26 PROCEDURE — 85027 COMPLETE CBC AUTOMATED: CPT

## 2022-10-26 PROCEDURE — 36415 COLL VENOUS BLD VENIPUNCTURE: CPT

## 2022-10-26 PROCEDURE — 82248 BILIRUBIN DIRECT: CPT

## 2022-10-26 PROCEDURE — 80053 COMPREHEN METABOLIC PANEL: CPT

## 2022-10-26 PROCEDURE — 85610 PROTHROMBIN TIME: CPT

## 2022-10-27 ENCOUNTER — HOSPITAL ENCOUNTER (OUTPATIENT)
Dept: ULTRASOUND IMAGING | Facility: HOSPITAL | Age: 67
Discharge: HOME | End: 2022-10-27
Attending: INTERNAL MEDICINE | Admitting: INTERNAL MEDICINE
Payer: COMMERCIAL

## 2022-10-27 DIAGNOSIS — K74.3 PRIMARY BILIARY CHOLANGITIS (H): ICD-10-CM

## 2022-10-27 PROCEDURE — 76705 ECHO EXAM OF ABDOMEN: CPT

## 2022-10-28 DIAGNOSIS — K74.3 PRIMARY BILIARY CHOLANGITIS (H): ICD-10-CM

## 2022-10-31 DIAGNOSIS — K74.3 PRIMARY BILIARY CHOLANGITIS (H): Primary | ICD-10-CM

## 2022-10-31 RX ORDER — URSODIOL 250 MG/1
TABLET, FILM COATED ORAL
Qty: 360 TABLET | Refills: 3 | Status: SHIPPED | OUTPATIENT
Start: 2022-10-31 | End: 2023-05-15

## 2022-11-19 ENCOUNTER — HEALTH MAINTENANCE LETTER (OUTPATIENT)
Age: 67
End: 2022-11-19

## 2023-01-03 ENCOUNTER — TELEPHONE (OUTPATIENT)
Dept: GASTROENTEROLOGY | Facility: CLINIC | Age: 68
End: 2023-01-03
Payer: COMMERCIAL

## 2023-01-03 ENCOUNTER — TELEPHONE (OUTPATIENT)
Dept: GASTROENTEROLOGY | Facility: CLINIC | Age: 68
End: 2023-01-03

## 2023-01-03 NOTE — TELEPHONE ENCOUNTER
Central Prior Authorization Team   Phone: [phone]      PA Initiation    Medication: Ursodiol 250 mg tab -PA initiated  Insurance Company: [employer] - Phone [phone] Fax [phone]  Pharmacy Filling the Rx: [facility] - [address]  Filling Pharmacy Phone: [phone]  Filling Pharmacy Fax:    Start Date: 1/3/2023

## 2023-01-03 NOTE — TELEPHONE ENCOUNTER
Xifaxan Rx not located in patient's Epic medication list.   For PA team to complete a prior authorization the drug has to be prescribed by a [facility] provider.      Please enter a Rx in Epic with strength of Xifaxan, directions for use, diagnosis and prescribing provider.  Please route back to PA team to then initiate request.

## 2023-01-03 NOTE — TELEPHONE ENCOUNTER
Prior Authorization Retail Medication Request    Medication/Dose:   ICD code (if different than what is on RX):  K72.90   Previously Tried and Failed:  Lactulose alone  Rationale:  Xifaxan helps to lower the toxin build up in the blood while lactulose works by cleansing the toxin build up in the liver. Adjunctive therapy.      Insurance Name:    Insurance ID:        Pharmacy Information (if different than what is on RX)  Name:    Phone:

## 2023-01-05 NOTE — TELEPHONE ENCOUNTER
Prior Authorization Not Needed per Insurance-There is no screenshot of request for a PA, so I'm not certain who notified the clinic this needed it.       Medication: Ursodiol 250 mg tab -PA not needed  Insurance Company: [employer] - Phone [phone] Fax [phone]  Expected CoPay:      Pharmacy Filling the Rx: [facility] - [place], [place] - [address] [place] [address]  Pharmacy Notified: No  Patient Notified: Yes-I left a voicemail for patient to contact pharmacy to order and to call me if she has any issues with this.

## 2023-03-20 NOTE — TELEPHONE ENCOUNTER
Called back [facility] and scheduled patient for a complete eye exam   Patient hasn't been seen since 2015 and is getting re-established in Hepatology. Pt requested refill of Ursodiol and Dr. Brooks approved only through patient's upcoming appointment date. Refilled for 2 months and 14 days on 5/5/17 which would give over a week to get the new prescription once re-established.    Pt requesting 3 month supply since it would be $10 for 3 month supply rather than $10 for 1 month supply (per pt). Writer explained provider preference and will send request to covering provider if ok.

## 2023-04-09 ENCOUNTER — HEALTH MAINTENANCE LETTER (OUTPATIENT)
Age: 68
End: 2023-04-09

## 2023-04-24 DIAGNOSIS — R68.81 EARLY SATIETY: ICD-10-CM

## 2023-04-24 DIAGNOSIS — M85.88 OTHER SPECIFIED DISORDERS OF BONE DENSITY AND STRUCTURE, OTHER SITE: ICD-10-CM

## 2023-04-24 DIAGNOSIS — K74.3 PRIMARY BILIARY CHOLANGITIS (H): Primary | ICD-10-CM

## 2023-04-25 ENCOUNTER — LAB (OUTPATIENT)
Dept: LAB | Facility: CLINIC | Age: 68
End: 2023-04-25
Payer: COMMERCIAL

## 2023-04-25 DIAGNOSIS — M85.88 OTHER SPECIFIED DISORDERS OF BONE DENSITY AND STRUCTURE, OTHER SITE: ICD-10-CM

## 2023-04-25 DIAGNOSIS — R68.81 EARLY SATIETY: ICD-10-CM

## 2023-04-25 DIAGNOSIS — K74.3 PRIMARY BILIARY CHOLANGITIS (H): ICD-10-CM

## 2023-04-25 LAB
ALBUMIN SERPL BCG-MCNC: 4.2 G/DL (ref 3.5–5.2)
ALP SERPL-CCNC: 126 U/L (ref 35–104)
ALT SERPL W P-5'-P-CCNC: 20 U/L (ref 10–35)
ANION GAP SERPL CALCULATED.3IONS-SCNC: 16 MMOL/L (ref 7–15)
AST SERPL W P-5'-P-CCNC: 25 U/L (ref 10–35)
BILIRUB DIRECT SERPL-MCNC: 0.25 MG/DL (ref 0–0.3)
BILIRUB SERPL-MCNC: 0.8 MG/DL
BUN SERPL-MCNC: 14.5 MG/DL (ref 8–23)
CALCIUM SERPL-MCNC: 9.9 MG/DL (ref 8.8–10.2)
CHLORIDE SERPL-SCNC: 102 MMOL/L (ref 98–107)
CREAT SERPL-MCNC: 0.72 MG/DL (ref 0.51–0.95)
DEPRECATED HCO3 PLAS-SCNC: 22 MMOL/L (ref 22–29)
GFR SERPL CREATININE-BSD FRML MDRD: >90 ML/MIN/1.73M2
GLUCOSE SERPL-MCNC: 92 MG/DL (ref 70–99)
INR PPP: 1.08 (ref 0.85–1.15)
POTASSIUM SERPL-SCNC: 4.7 MMOL/L (ref 3.4–5.3)
PROT SERPL-MCNC: 7.5 G/DL (ref 6.4–8.3)
SODIUM SERPL-SCNC: 140 MMOL/L (ref 136–145)
TSH SERPL DL<=0.005 MIU/L-ACNC: 1.72 UIU/ML (ref 0.3–4.2)

## 2023-04-25 PROCEDURE — 36415 COLL VENOUS BLD VENIPUNCTURE: CPT

## 2023-04-25 PROCEDURE — 82248 BILIRUBIN DIRECT: CPT

## 2023-04-25 PROCEDURE — 80053 COMPREHEN METABOLIC PANEL: CPT

## 2023-04-25 PROCEDURE — 85610 PROTHROMBIN TIME: CPT

## 2023-04-25 PROCEDURE — 84443 ASSAY THYROID STIM HORMONE: CPT

## 2023-05-02 ENCOUNTER — HOSPITAL ENCOUNTER (OUTPATIENT)
Dept: ULTRASOUND IMAGING | Facility: HOSPITAL | Age: 68
Discharge: HOME | End: 2023-05-02
Attending: INTERNAL MEDICINE | Admitting: INTERNAL MEDICINE
Payer: COMMERCIAL

## 2023-05-02 DIAGNOSIS — K74.3 PRIMARY BILIARY CHOLANGITIS (H): ICD-10-CM

## 2023-05-02 PROCEDURE — 76705 ECHO EXAM OF ABDOMEN: CPT

## 2023-05-12 ENCOUNTER — VIRTUAL VISIT (OUTPATIENT)
Dept: GASTROENTEROLOGY | Facility: CLINIC | Age: 68
End: 2023-05-12
Attending: INTERNAL MEDICINE
Payer: COMMERCIAL

## 2023-05-12 VITALS — BODY MASS INDEX: 21.46 KG/M2 | WEIGHT: 125 LBS

## 2023-05-12 DIAGNOSIS — K73.2 CHRONIC ACTIVE HEPATITIS, NOT ELSEWHERE CLASSIFIED (H): ICD-10-CM

## 2023-05-12 DIAGNOSIS — K74.3 PRIMARY BILIARY CHOLANGITIS (H): Primary | ICD-10-CM

## 2023-05-12 PROCEDURE — 99214 OFFICE O/P EST MOD 30 MIN: CPT | Mod: VID | Performed by: INTERNAL MEDICINE

## 2023-05-12 ASSESSMENT — PAIN SCALES - GENERAL: PAINLEVEL: NO PAIN (0)

## 2023-05-12 NOTE — PROGRESS NOTES
"Shasha Andrews is a 67 year old female who is being evaluated via a billable *** visit.      Virtual Visit Details    Type of service:  Video Visit   Video Start Time: {video visit start/end time for provider to select:661560}  Video End Time:{video visit start/end time for provider to select:764088}    Originating Location (pt. Location): {video visit patient location:180560::\"Home\"}  {PROVIDER LOCATION On-site should be selected for visits conducted from your clinic location or adjoining [facility] hospital, academic office, or other nearby [facility] building. Off-site should be selected for all other provider locations, including home:457003}  Distant Location (provider location):  {virtual location provider:923421}  Platform used for Video Visit: {Virtual Visit Platforms:416686::\"[employer]\"}    "

## 2023-05-12 NOTE — NURSING NOTE
Is the patient currently in the state of [place]? YES    Visit mode:VIDEO    If the visit is dropped, the patient can be reconnected by: VIDEO VISIT: Text to cell phone: [phone]    Will anyone else be joining the visit? NO      How would you like to obtain your AVS? MyChart    Are changes needed to the allergy or medication list? NO    Reason for visit: Video Visit

## 2023-05-12 NOTE — PROGRESS NOTES
[facility]  LIVER CLINIC FOLLOW UP  VIDEO VISIT    A/P  Ms. Andrews is a 67 Y F with PBC diagnosed 2010. On phoenix since with normal liver tests. Fibrosis scan F2 in 2018. Today we reviewed all of the labs, the diagnosis, treatment and sequelae related to having PBC.    PBC Continue on phoenix. Renewed. Labs every 6 months. Ordered  Variceal screening EGD 10/14/21. No varices. Due 2024.  Osteoporosis on DEXA 4/2022. On ca. Will discuss next visit  HCC screening US 5/2/23 no mass. Due 6 mo. Ordered  Liver function normal LFTs. Labs every 6 months. Ordered  Thrombocytopenia Improved from 40s to 140.  Alcohol use stopped 7/6/17.   CRC screening Colonoscopy 1/31/18. Repeat 2025. Will get with EGD in 2024.    RTC 1 year.  =========================================  Subjective: Ms. Andrews 67 Y F with PBC. Initial diagnosis of PBC was in 2010 at [place]. She had an elevated AMA (we do not have value) and a normal MRE. Alk phos was over 400 at that time. AMA in 2014 was 153.5. She was started on phoenix in 2010. She takes 250 mg QID. She prefers QID dosing.     She is doing well. No changes in her health.  Other than thrombocytopenia, her liver tests and other labs have been normal.     Fibrosis scan in 2018 F2.    Quit alcohol 7/6/17.     Last EGD: 2021 no varices   Last HCC screening: US 5/2022 no mass  DEXA:2022 osteoporosis    Liver Function Studies - Recent Labs   Lab Test 04/25/23  1457   PROTTOTAL 7.5   ALBUMIN 4.2   BILITOTAL 0.8   ALKPHOS 126*   AST 25   ALT 20     CBC RESULTS: Recent Labs   Lab Test 10/26/22  0926   WBC 5.1   RBC 4.37   HGB 13.5   HCT 39.8   MCV 91   MCH 30.9   MCHC 33.9   RDW 12.9   *     MELD-Na score: 7 at 4/25/2023  2:57 PM  MELD score: 7 at 4/25/2023  2:57 PM  Calculated from:  Serum Creatinine: 0.72 mg/dL (Using min of 1 mg/dL) at 4/25/2023  2:57 PM  Serum Sodium: 140 mmol/L (Using max of 137 mmol/L) at 4/25/2023  2:57 PM  Total Bilirubin: 0.8 mg/dL (Using min of 1 mg/dL) at  "4/25/2023  2:57 PM  INR(ratio): 1.08 at 4/25/2023  2:57 PM  Age: 67 years          Current Outpatient Medications   Medication     Ascorbic Acid (VITAMIN C PO)     calcium carbonate-vitamin D 600-400 MG-UNIT CHEW     Coenzyme Q10 (CO Q 10 PO)     glucosamine-chondroitin 500-400 MG CAPS per capsule     Omega-3 Fatty Acids (RA FISH OIL) 1000 MG CAPS     ursodiol (ACTIGALL) 250 MG tablet     vitamin B complex with vitamin C (VITAMIN  B COMPLEX) TABS tablet     Vitamin D, Cholecalciferol, 1000 units CAPS     VITAMIN E COMPLEX PO     GLUCOSAMINE SULFATE PO     No current facility-administered medications for this visit.   Probiotic  Zinc 50 mg  Papaya enzymes prn stomach upset  Collagen peptide for 2 weeks    Exam  Gen Alert pleasant NAD  Resp No difficulty breathing. No cough  Skin No Jaundice  Eyes No icterus  Neuro GARZON  MSK no muscle wasting  Psyche Pleasant, appropriate. Well groomed.  Shasha Andrews is a 67 year old female who is being evaluated via a billable video visit.      The patient has been notified of following:   \"This video visit will be conducted via a call between you and your physician/provider. We have found that certain health care needs can be provided without the need for an in-person physical exam.  This service lets us provide the care you need with a video conversation.  If a prescription is necessary we can send it directly to your pharmacy.  If lab work is needed we can place an order for that and you can then stop by our lab to have the test done at a later time. Video visits are billed at different rates depending on your insurance coverage.  Please reach out to your insurance provider with any questions.  If during the course of the call the physician/provider feels a video visit is not appropriate, you will not be charged for this service.\"   Patient has given verbal consent for Video visit? Yes    Type of service:  Video Visit  Video Start Time: 913  Video End Time 929  Patient location: " home  Will anyone else be joining your video visit? No  {If patient encounters technical issues they should call [phone] :1  Distant Location (provider location):  [facility]PATOLOG[facility] [place]   Mode of Communication:  Video Conference via [employer]  I have reviewed and updated the patient's Past Medical History, Social History, Family History and Medication List.

## 2023-05-12 NOTE — LETTER
5/12/2023         RE: Shasha Andrews  [address] [address] [address]        Dear Colleague,    Thank you for referring your patient, Shasha Andrews, to the [facility]PAT[facility] [place]. Please see a copy of my visit note below.    [facility]  LIVER CLINIC FOLLOW UP  VIDEO VISIT    A/P  Ms. Andrews is a 67 Y F with PBC diagnosed 2010. On phoenix since with normal liver tests. Fibrosis scan F2 in 2018. Today we reviewed all of the labs, the diagnosis, treatment and sequelae related to having PBC.    PBC Continue on phoenix. Renewed. Labs every 6 months. Ordered  Variceal screening EGD 10/14/21. No varices. Due 2024.  Osteoporosis on DEXA 4/2022. On ca. Will discuss next visit  HCC screening US 5/2/23 no mass. Due 6 mo. Ordered  Liver function normal LFTs. Labs every 6 months. Ordered  Thrombocytopenia Improved from 40s to 140.  Alcohol use stopped 7/6/17.   CRC screening Colonoscopy 1/31/18. Repeat 2025. Will get with EGD in 2024.    RTC 1 year.  =========================================  Subjective: Ms. Andrews 67 Y F with PBC. Initial diagnosis of PBC was in 2010 at [place]. She had an elevated AMA (we do not have value) and a normal MRE. Alk phos was over 400 at that time. AMA in 2014 was 153.5. She was started on phoenix in 2010. She takes 250 mg QID. She prefers QID dosing.     She is doing well. No changes in her health.  Other than thrombocytopenia, her liver tests and other labs have been normal.     Fibrosis scan in 2018 F2.    Quit alcohol 7/6/17.     Last EGD: 2021 no varices   Last HCC screening: US 5/2022 no mass  DEXA:2022 osteoporosis    Liver Function Studies - Recent Labs   Lab Test 04/25/23  1457   PROTTOTAL 7.5   ALBUMIN 4.2   BILITOTAL 0.8   ALKPHOS 126*   AST 25   ALT 20     CBC RESULTS: Recent Labs   Lab Test 10/26/22  0926   WBC 5.1   RBC 4.37   HGB 13.5   HCT 39.8   MCV 91   MCH 30.9   MCHC 33.9   RDW 12.9   *     MELD-Na score: 7 at 4/25/2023  2:57  "PM  MELD score: 7 at 4/25/2023  2:57 PM  Calculated from:  Serum Creatinine: 0.72 mg/dL (Using min of 1 mg/dL) at 4/25/2023  2:57 PM  Serum Sodium: 140 mmol/L (Using max of 137 mmol/L) at 4/25/2023  2:57 PM  Total Bilirubin: 0.8 mg/dL (Using min of 1 mg/dL) at 4/25/2023  2:57 PM  INR(ratio): 1.08 at 4/25/2023  2:57 PM  Age: 67 years          Current Outpatient Medications   Medication    Ascorbic Acid (VITAMIN C PO)    calcium carbonate-vitamin D 600-400 MG-UNIT CHEW    Coenzyme Q10 (CO Q 10 PO)    glucosamine-chondroitin 500-400 MG CAPS per capsule    Omega-3 Fatty Acids (RA FISH OIL) 1000 MG CAPS    ursodiol (ACTIGALL) 250 MG tablet    vitamin B complex with vitamin C (VITAMIN  B COMPLEX) TABS tablet    Vitamin D, Cholecalciferol, 1000 units CAPS    VITAMIN E COMPLEX PO    GLUCOSAMINE SULFATE PO     No current facility-administered medications for this visit.   Probiotic  Zinc 50 mg  Papaya enzymes prn stomach upset  Collagen peptide for 2 weeks    Exam  Gen Alert pleasant NAD  Resp No difficulty breathing. No cough  Skin No Jaundice  Eyes No icterus  Neuro GARZON  MSK no muscle wasting  Psyche Pleasant, appropriate. Well groomed.  Shasha Andrews is a 67 year old female who is being evaluated via a billable video visit.      The patient has been notified of following:   \"This video visit will be conducted via a call between you and your physician/provider. We have found that certain health care needs can be provided without the need for an in-person physical exam.  This service lets us provide the care you need with a video conversation.  If a prescription is necessary we can send it directly to your pharmacy.  If lab work is needed we can place an order for that and you can then stop by our lab to have the test done at a later time. Video visits are billed at different rates depending on your insurance coverage.  Please reach out to your insurance provider with any questions.  If during the course of the call the " "physician/provider feels a video visit is not appropriate, you will not be charged for this service.\"   Patient has given verbal consent for Video visit? Yes    Type of service:  Video Visit  Video Start Time: 913  Video End Time 929  Patient location: home  Will anyone else be joining your video visit? No  {If patient encounters technical issues they should call [phone] :1  Distant Location (provider location):  [facility]PATOLOG[facility] [place]   Mode of Communication:  Video Conference via [employer]  I have reviewed and updated the patient's Past Medical History, Social History, Family History and Medication List.      "

## 2023-05-15 RX ORDER — URSODIOL 250 MG/1
250 TABLET, FILM COATED ORAL 4 TIMES DAILY
Qty: 360 TABLET | Refills: 3 | Status: SHIPPED | OUTPATIENT
Start: 2023-05-15 | End: 2024-03-29

## 2023-05-16 ENCOUNTER — TELEPHONE (OUTPATIENT)
Dept: GASTROENTEROLOGY | Facility: CLINIC | Age: 68
End: 2023-05-16
Payer: COMMERCIAL

## 2023-05-16 NOTE — TELEPHONE ENCOUNTER
MTM referral from: [facility] visit (referral by provider)    MTM referral outreach attempt #2 on May 16, 2023 at 11:53 AM      Outcome: Patient not reachable after several attempts, will route to MTM Pharmacist/Provider as an FYI.  MT scheduling number is [phone].  Thank you for the referral.    Use [facility] PART D MAP for the carrier/Plan on the flowsheet    Melissa Mckeon - [facility]

## 2023-07-18 ENCOUNTER — VIRTUAL VISIT (OUTPATIENT)
Dept: PHARMACY | Facility: CLINIC | Age: 68
End: 2023-07-18
Payer: COMMERCIAL

## 2023-07-18 DIAGNOSIS — Z78.9 TAKES DIETARY SUPPLEMENTS: ICD-10-CM

## 2023-07-18 DIAGNOSIS — K74.3 PRIMARY BILIARY CHOLANGITIS (H): Primary | ICD-10-CM

## 2023-07-18 PROCEDURE — 99605 MTMS BY PHARM NP 15 MIN: CPT | Performed by: PHARMACIST

## 2023-07-18 NOTE — LETTER
"Recommended To-Do List      Prepared on: 07/21/2023       You can get the best results from your medications by completing the items on this \"To-Do List.\"      Bring your To-Do List when you go to your doctor. And, share it with your family or caregivers.    My To-Do List:  What we talked about: What I should do:    We discussed your medications     Continue medications as prescribed              "

## 2023-07-18 NOTE — LETTER
July 21, 2023  Shasha [place]olas[place]ent[place]  [address] [address] [address]    Dear CINDY [facility]     Thank you for talking with me on Jul 18, 2023 about your health and medications. As a follow-up to our conversation, I have included two documents:      1. Your Recommended To-Do List has steps you should take to get the best results from your medications.  2. Your Medication List will help you keep track of your medications and how to take them.    If you want to talk about these documents, please call Sandor Sierra RPH at phone: [phone], Monday-Friday 8-4:30pm.    I look forward to working with you and your doctors to make sure your medications work well for you.    Sincerely,  Sandor Sierra RPH  [facility] Pharmacist, [facility]

## 2023-07-18 NOTE — LETTER
_  Medication List        Prepared on: 07/21/2023     Bring your Medication List when you go to the doctor, hospital, or   emergency room. And, share it with your family or caregivers.     Note any changes to how you take your medications.  Cross out medications when you no longer use them.    Medication How I take it Why I use it Prescriber   Ascorbic Acid (VITAMIN C PO) Take 1,000 mg by mouth daily Supplements Patient Reported   calcium carbonate-vitamin D 600-400 MG-UNIT CHEW Take 1 tablet by mouth daily  Supplements Patient Reported   Coenzyme Q10 (CO Q 10 PO) Take 1 tablet by mouth daily  Supplements Patient Reported   COLLAGEN PO Take by mouth daily  Supplements Patient Reported   glucosamine-chondroitin 500-400 MG CAPS per capsule Take 2 capsules by mouth daily  Primary biliary cholangitis (H) Patient Reported   Omega-3 Fatty Acids (RA FISH OIL) 1000 MG CAPS Take 2,000 mg by mouth once a week Primary biliary cholangitis (H) Patient Reported   ursodiol (ACTIGALL) 250 MG tablet Take 1 tablet (250 mg) by mouth 4 times daily Primary biliary cholangitis (H) Montserrat Magallanes MD   vitamin B complex with vitamin C (VITAMIN  B COMPLEX) TABS tablet Take 1 tablet by mouth daily  Supplements Patient Reported   Vitamin D, Cholecalciferol, 1000 units CAPS Take 1 tablet by mouth daily  Supplements Patient Reported   VITAMIN E COMPLEX PO Take 400 Units by mouth three times a week  Supplements Patient Reported         Add new medications, over-the-counter drugs, herbals, vitamins, or  minerals in the blank rows below.    Medication How I take it Why I use it Prescriber                                      Allergies:      penicillins; latex        Side effects I have had:               Other Information:              My notes and questions:

## 2023-07-18 NOTE — PROGRESS NOTES
Medication Therapy Management (MTM) Encounter    ASSESSMENT:                            Medication Adherence/Access: No issues identified    Primary biliary cholangitis:  None of her supplements should interact with the ursodiol. The antacid that could interact with ursodiol would be aluminum hydroxide.    Supplements:   Stable.     PLAN:                            1. Okay to take your supplements with ursodiol    Follow-up: Return if symptoms worsen or fail to improve.    SUBJECTIVE/OBJECTIVE:                          Shasha Andrews is a 67 year old female called for an initial visit. She was referred to me from dr. Montserrat Murphy     Reason for visit: Ursodiol question.    Allergies/ADRs: Reviewed in chart  Past Medical History: Reviewed in chart  Tobacco: She reports that she quit smoking about 36 years ago. Her smoking use included cigarettes. She started smoking about 53 years ago. She has never used smokeless tobacco.  Alcohol: not discussed      Medication Adherence/Access: no issues reported    Primary biliary cholangitis:  Ursodiol 250 mg four times daily    She has no issues with the medication but is wondering if it interacts with any of her supplements. She was told she couldn't take antacids and she is wondering if her calcium is like Tums and would react.    Supplements:   Vitamin C 1000 mg daily  Calcium/vitamin D 600 mg/400 unit daily  Coenzyme Q10 daily  Collagen daily  Glucosamin-chondroiton 2 caps daily  Fish oil 2 g weekly  Vitamin B complex daily  Vitamin D 1000 units daily  Vitamin E 400 units three times a week    No concerns or questions at this time.     Today's Vitals: There were no vitals taken for this visit.  ----------------      I spent 30 minutes with this patient today. I offer these suggestions with the understanding that I don't fully understand Shasha's past medical history and the complexity of her health conditions. Shasha should make no changes without the approval of her  physician. A copy of the visit note was provided to the patient's provider(s).    A summary of these recommendations was sent via [employer].    Sandor Sierra, Pharm. D., [facility]  Medication Therapy Management Pharmacist      Telemedicine Visit Details  Type of service:  Telephone visit  Start Time: 3 p  End Time: 3:30 p     Medication Therapy Recommendations  No medication therapy recommendations to display

## 2023-07-21 NOTE — PATIENT INSTRUCTIONS
"Recommendations from today's MTM visit:                                                    MTM (medication therapy management) is a service provided by a clinical pharmacist designed to help you get the most of out of your medicines.   Today we reviewed what your medicines are for, how to know if they are working, that your medicines are safe and how to make your medicine regimen as easy as possible.      1. Okay to take your supplements with ursodiol    Follow-up: Return if symptoms worsen or fail to improve.    It was great speaking with you today.  I value your experience and would be very thankful for your time in providing feedback in our clinic survey. In the next few days, you may receive an email or text message from [employer] with a link to a survey related to your  clinical pharmacist.\"     To schedule another MTM appointment, please call the clinic directly or you may call the MTM scheduling line at [phone] or toll-free at [phone].     My Clinical Pharmacist's contact information:                                                      Please feel free to contact me with any questions or concerns you have.      Sandor Sierra, Pharm. D., BCACP  Medication Therapy Management Pharmacist     "

## 2023-09-01 ENCOUNTER — TELEPHONE (OUTPATIENT)
Dept: PHARMACY | Facility: CLINIC | Age: 68
End: 2023-09-01
Payer: COMMERCIAL

## 2023-09-01 NOTE — CONFIDENTIAL NOTE
Patient called today to ask if loratadine would be safe to take with her conditions. Discussed with her that there is a small risk for hepatic impairment but otherwise should be fine to take.    Sandor Sierra, PharmRobert D., [facility]  Medication Therapy Management Pharmacist

## 2023-11-03 ENCOUNTER — TELEPHONE (OUTPATIENT)
Dept: GASTROENTEROLOGY | Facility: CLINIC | Age: 68
End: 2023-11-03
Payer: COMMERCIAL

## 2023-11-14 ENCOUNTER — HOSPITAL ENCOUNTER (OUTPATIENT)
Dept: ULTRASOUND IMAGING | Facility: HOSPITAL | Age: 68
Discharge: HOME | End: 2023-11-14
Attending: INTERNAL MEDICINE | Admitting: INTERNAL MEDICINE
Payer: COMMERCIAL

## 2023-11-14 DIAGNOSIS — K74.3 PRIMARY BILIARY CHOLANGITIS (H): ICD-10-CM

## 2023-11-14 PROCEDURE — 76705 ECHO EXAM OF ABDOMEN: CPT

## 2023-11-16 DIAGNOSIS — K74.3 PRIMARY BILIARY CHOLANGITIS (H): Primary | ICD-10-CM

## 2024-03-29 DIAGNOSIS — K74.3 PRIMARY BILIARY CHOLANGITIS (H): ICD-10-CM

## 2024-03-29 RX ORDER — URSODIOL 250 MG/1
250 TABLET, FILM COATED ORAL 4 TIMES DAILY
Qty: 360 TABLET | Refills: 3 | Status: SHIPPED | OUTPATIENT
Start: 2024-03-29 | End: 2024-05-13

## 2024-05-03 ENCOUNTER — LAB (OUTPATIENT)
Dept: LAB | Facility: CLINIC | Age: 69
End: 2024-05-03
Attending: INTERNAL MEDICINE
Payer: COMMERCIAL

## 2024-05-03 ENCOUNTER — TELEPHONE (OUTPATIENT)
Dept: GASTROENTEROLOGY | Facility: CLINIC | Age: 69
End: 2024-05-03

## 2024-05-03 DIAGNOSIS — K74.3 PRIMARY BILIARY CHOLANGITIS (H): ICD-10-CM

## 2024-05-03 DIAGNOSIS — K73.2 CHRONIC ACTIVE HEPATITIS, NOT ELSEWHERE CLASSIFIED (H): ICD-10-CM

## 2024-05-03 LAB
ERYTHROCYTE [DISTWIDTH] IN BLOOD BY AUTOMATED COUNT: 12.9 % (ref 10–15)
HCT VFR BLD AUTO: 40 % (ref 35–47)
HGB BLD-MCNC: 13.4 G/DL (ref 11.7–15.7)
INR PPP: 1.09 (ref 0.85–1.15)
MCH RBC QN AUTO: 30.9 PG (ref 26.5–33)
MCHC RBC AUTO-ENTMCNC: 33.5 G/DL (ref 31.5–36.5)
MCV RBC AUTO: 92 FL (ref 78–100)
PLATELET # BLD AUTO: 29 10E3/UL (ref 150–450)
RBC # BLD AUTO: 4.34 10E6/UL (ref 3.8–5.2)
WBC # BLD AUTO: 5 10E3/UL (ref 4–11)

## 2024-05-03 PROCEDURE — 82248 BILIRUBIN DIRECT: CPT

## 2024-05-03 PROCEDURE — 82105 ALPHA-FETOPROTEIN SERUM: CPT

## 2024-05-03 PROCEDURE — 85610 PROTHROMBIN TIME: CPT

## 2024-05-03 PROCEDURE — 36415 COLL VENOUS BLD VENIPUNCTURE: CPT

## 2024-05-03 PROCEDURE — 85027 COMPLETE CBC AUTOMATED: CPT

## 2024-05-03 PROCEDURE — 80053 COMPREHEN METABOLIC PANEL: CPT

## 2024-05-03 NOTE — TELEPHONE ENCOUNTER
DATE/TIME OF CALL RECEIVED FROM LAB:  05/03/24 at 10:49 AM   LAB TEST:  Platelet (tech wanted to let writer know they had concerns that patient's platelets clumped when completed at [facility] recently. Concern for reaction with tube material.)  LAB VALUE:  29 (was 32 but  repeated test)  PROVIDER NOTIFIED?: Yes  PROVIDER NAME: Dr. Montserrat Magallanes  DATE/TIME LAB VALUE REPORTED TO PROVIDER: 5/3/24 at 10:50 AM  MECHANISM OF PROVIDER NOTIFICATION:  in-basket  PROVIDER RESPONSE: TBD

## 2024-05-04 LAB
AFP SERPL-MCNC: 2.7 NG/ML
ALBUMIN SERPL BCG-MCNC: 4.2 G/DL (ref 3.5–5.2)
ALP SERPL-CCNC: 117 U/L (ref 40–150)
ALT SERPL W P-5'-P-CCNC: 20 U/L (ref 0–50)
ANION GAP SERPL CALCULATED.3IONS-SCNC: 9 MMOL/L (ref 7–15)
AST SERPL W P-5'-P-CCNC: 27 U/L (ref 0–45)
BILIRUB DIRECT SERPL-MCNC: 0.24 MG/DL (ref 0–0.3)
BILIRUB SERPL-MCNC: 0.9 MG/DL
BUN SERPL-MCNC: 11.9 MG/DL (ref 8–23)
CALCIUM SERPL-MCNC: 9.6 MG/DL (ref 8.8–10.2)
CHLORIDE SERPL-SCNC: 103 MMOL/L (ref 98–107)
CREAT SERPL-MCNC: 0.71 MG/DL (ref 0.51–0.95)
DEPRECATED HCO3 PLAS-SCNC: 26 MMOL/L (ref 22–29)
EGFRCR SERPLBLD CKD-EPI 2021: >90 ML/MIN/1.73M2
GLUCOSE SERPL-MCNC: 90 MG/DL (ref 70–99)
POTASSIUM SERPL-SCNC: 4.3 MMOL/L (ref 3.4–5.3)
PROT SERPL-MCNC: 7.4 G/DL (ref 6.4–8.3)
SODIUM SERPL-SCNC: 138 MMOL/L (ref 135–145)

## 2024-05-13 ENCOUNTER — VIRTUAL VISIT (OUTPATIENT)
Dept: GASTROENTEROLOGY | Facility: CLINIC | Age: 69
End: 2024-05-13
Attending: INTERNAL MEDICINE
Payer: COMMERCIAL

## 2024-05-13 VITALS — BODY MASS INDEX: 21.34 KG/M2 | HEIGHT: 64 IN | WEIGHT: 125 LBS

## 2024-05-13 DIAGNOSIS — K74.3 PRIMARY BILIARY CHOLANGITIS (H): Primary | ICD-10-CM

## 2024-05-13 DIAGNOSIS — K74.00 HEPATIC FIBROSIS, UNSPECIFIED: ICD-10-CM

## 2024-05-13 PROCEDURE — 99214 OFFICE O/P EST MOD 30 MIN: CPT | Mod: 95 | Performed by: INTERNAL MEDICINE

## 2024-05-13 RX ORDER — URSODIOL 250 MG/1
250 TABLET, FILM COATED ORAL 4 TIMES DAILY
Qty: 360 TABLET | Refills: 3 | Status: SHIPPED | OUTPATIENT
Start: 2024-05-13

## 2024-05-13 ASSESSMENT — PAIN SCALES - GENERAL: PAINLEVEL: NO PAIN (0)

## 2024-05-13 NOTE — NURSING NOTE
Is the patient currently in the state of [place]? YES    Visit mode:VIDEO    If the visit is dropped, the patient can be reconnected by: VIDEO VISIT: Text to cell phone:   Telephone Information:   Mobile [phone]       Will anyone else be joining the visit? NO  (If patient encounters technical issues they should call [phone])    How would you like to obtain your AVS? MyChart    Are changes needed to the allergy or medication list? No    Are refills needed on medications prescribed by this physician? YES    Reason for visit: Video Visit (Recheck)    Bessy CALDWELL

## 2024-05-13 NOTE — PROGRESS NOTES
[facility]  LIVER CLINIC FOLLOW UP  VIDEO VISIT    A/P  Ms. Andrews is a 68 Y F with PBC diagnosed 2010. On phoenix since with normal liver tests. Fibrosis scan F2 in 2018.     PBC Continue on phoenix. Renewed. Labs every 6 months. Ordered. Last fibrosis scan was 2018. I would like to repeat fibrosis scan. Ordered.  Variceal screening Done for low plt, c/f portal HTN. EGD 10/14/21. No varices. Due 2024.Ordered  Osteoporosis on DEXA 4/2022. Mgmt per PCP  HCC screening US 5/2/23 no mass. Having US tomorrow.  Liver function normal LFTs. Labs every 6 months. Ordered  Thrombocytopenia Improved from 40s to 140 then has gone down again now at 29. Given the wide variation, atypical for portal HTN, I have place hematology referral.  Alcohol use stopped 7/6/17.   CRC screening Colonoscopy 1/31/18. Repeat 2025. Ordered along with EGD    RTC 1 year in preson or video  ========================================  Subjective: Ms. Andrews 68 Y F with PBC. Initial diagnosis of PBC was in 2010 at [place]. She had an elevated AMA (we do not have value) and a normal MRE. Alk phos was over 400 at that time. AMA in 2014 was 153.5. She was started on phoenix in 2010. She takes 250 mg QID. She prefers QID dosing.     She is doing well. No changes in her health.  Other than thrombocytopenia, her liver tests and other labs have been normal. She had a plt count that was around 140 and then decreased again, now 29. She does notice she bruises a little more    Fibrosis scan in 2018 F2.    Quit alcohol 7/6/17.     Last EGD: 2021 no varices   Last HCC screening: US 5/2022 no mass. US tomorrow  DEXA:2022 osteoporosis    Lab Test 05/03/24  0950   PROTTOTAL 7.4   ALBUMIN 4.2   BILITOTAL 0.9   ALKPHOS 117   AST 27   ALT 20     Lab Test 05/03/24  0950   WBC 5.0   RBC 4.34   HGB 13.4   HCT 40.0   MCV 92   MCH 30.9   MCHC 33.5   RDW 12.9   PLT 29*       Liver Function Studies - Recent Labs   Lab Test 04/25/23  1457   PROTTOTAL 7.5   ALBUMIN 4.2    BILITOTAL 0.8   ALKPHOS 126*   AST 25   ALT 20     CBC RESULTS: Recent Labs   Lab Test 10/26/22  0926   WBC 5.1   RBC 4.37   HGB 13.5   HCT 39.8   MCV 91   MCH 30.9   MCHC 33.9   RDW 12.9   *     MELD 3.0: 8 at 5/3/2024  9:50 AM  MELD-Na: 7 at 5/3/2024  9:50 AM  Calculated from:  Serum Creatinine: 0.71 mg/dL (Using min of 1 mg/dL) at 5/3/2024  9:50 AM  Serum Sodium: 138 mmol/L (Using max of 137 mmol/L) at 5/3/2024  9:50 AM  Total Bilirubin: 0.9 mg/dL (Using min of 1 mg/dL) at 5/3/2024  9:50 AM  Serum Albumin: 4.2 g/dL (Using max of 3.5 g/dL) at 5/3/2024  9:50 AM  INR(ratio): 1.09 at 5/3/2024  9:50 AM  Age at listing (hypothetical): 68 years  Sex: Female at 5/3/2024  9:50 AM    Current Outpatient Medications   Medication Sig Dispense Refill    Ascorbic Acid (VITAMIN C PO) Take 1,000 mg by mouth daily      calcium carbonate-vitamin D 600-400 MG-UNIT CHEW Take 1 tablet by mouth daily      Coenzyme Q10 (CO Q 10 PO) Take 1 tablet by mouth daily      COLLAGEN PO Take by mouth daily      glucosamine-chondroitin 500-400 MG CAPS per capsule Take 2 capsules by mouth daily       Omega-3 Fatty Acids (RA FISH OIL) 1000 MG CAPS Take 2,000 mg by mouth once a week      ursodiol (ACTIGALL) 250 MG tablet Take 1 tablet (250 mg) by mouth 4 times daily 360 tablet 3    vitamin B complex with vitamin C (VITAMIN  B COMPLEX) TABS tablet Take 1 tablet by mouth daily      Vitamin D, Cholecalciferol, 1000 units CAPS Take 1 tablet by mouth daily      VITAMIN E COMPLEX PO Take 400 Units by mouth three times a week       No current facility-administered medications for this visit.   Probiotic  Zinc 50 mg  Papaya enzymes prn stomach upset  Collagen peptide for 2 weeks    Exam  Gen Alert pleasant NAD  Resp No difficulty breathing. No cough  Skin No Jaundice  Eyes No icterus  Neuro GARZON  MSK no muscle wasting  Psyche Pleasant, appropriate. Well groomed.      Shasha Andrews is a 68 year old female who is being evaluated via a billable video  visit.    Video-Visit Details  Type of service:  Video Visit  Video Start Time:1137  Video End Time: 1155  Originating Location (pt. Location):home  Distant Location (provider location):  [facility]PATOLOG[facility] [place]      Platform used for Video Visit: [employer]

## 2024-05-13 NOTE — LETTER
5/13/2024         RE: Shasha Andrews  [address] [facility] [address]        Dear Colleague,    Thank you for referring your patient, Shasha Andrews, to the [facility]PATOLOG[facility] [place]. Please see a copy of my visit note below.    [facility]  LIVER CLINIC FOLLOW UP  VIDEO VISIT    A/P  Ms. nAdrews is a 68 Y F with PBC diagnosed 2010. On phoenix since with normal liver tests. Fibrosis scan F2 in 2018.     PBC Continue on phoenix. Renewed. Labs every 6 months. Ordered. Last fibrosis scan was 2018. I would like to repeat fibrosis scan. Ordered.  Variceal screening Done for low plt, c/f portal HTN. EGD 10/14/21. No varices. Due 2024.Ordered  Osteoporosis on DEXA 4/2022. Mgmt per PCP  HCC screening US 5/2/23 no mass. Having US tomorrow.  Liver function normal LFTs. Labs every 6 months. Ordered  Thrombocytopenia Improved from 40s to 140 then has gone down again now at 29. Given the wide variation, atypical for portal HTN, I have place hematology referral.  Alcohol use stopped 7/6/17.   CRC screening Colonoscopy 1/31/18. Repeat 2025. Ordered along with EGD    RTC 1 year in preson or video  ========================================  Subjective: Ms. Andrews 68 Y F with PBC. Initial diagnosis of PBC was in 2010 at [place]. She had an elevated AMA (we do not have value) and a normal MRE. Alk phos was over 400 at that time. AMA in 2014 was 153.5. She was started on phoenix in 2010. She takes 250 mg QID. She prefers QID dosing.     She is doing well. No changes in her health.  Other than thrombocytopenia, her liver tests and other labs have been normal. She had a plt count that was around 140 and then decreased again, now 29. She does notice she bruises a little more    Fibrosis scan in 2018 F2.    Quit alcohol 7/6/17.     Last EGD: 2021 no varices   Last HCC screening: US 5/2022 no mass. US tomorrow  DEXA:2022 osteoporosis    Lab Test 05/03/24  0950   PROTTOTAL 7.4   ALBUMIN 4.2   BILITOTAL  0.9   ALKPHOS 117   AST 27   ALT 20     Lab Test 05/03/24  0950   WBC 5.0   RBC 4.34   HGB 13.4   HCT 40.0   MCV 92   MCH 30.9   MCHC 33.5   RDW 12.9   PLT 29*       Liver Function Studies - Recent Labs   Lab Test 04/25/23  1457   PROTTOTAL 7.5   ALBUMIN 4.2   BILITOTAL 0.8   ALKPHOS 126*   AST 25   ALT 20     CBC RESULTS: Recent Labs   Lab Test 10/26/22  0926   WBC 5.1   RBC 4.37   HGB 13.5   HCT 39.8   MCV 91   MCH 30.9   MCHC 33.9   RDW 12.9   *     MELD 3.0: 8 at 5/3/2024  9:50 AM  MELD-Na: 7 at 5/3/2024  9:50 AM  Calculated from:  Serum Creatinine: 0.71 mg/dL (Using min of 1 mg/dL) at 5/3/2024  9:50 AM  Serum Sodium: 138 mmol/L (Using max of 137 mmol/L) at 5/3/2024  9:50 AM  Total Bilirubin: 0.9 mg/dL (Using min of 1 mg/dL) at 5/3/2024  9:50 AM  Serum Albumin: 4.2 g/dL (Using max of 3.5 g/dL) at 5/3/2024  9:50 AM  INR(ratio): 1.09 at 5/3/2024  9:50 AM  Age at listing (hypothetical): 68 years  Sex: Female at 5/3/2024  9:50 AM    Current Outpatient Medications   Medication Sig Dispense Refill     Ascorbic Acid (VITAMIN C PO) Take 1,000 mg by mouth daily       calcium carbonate-vitamin D 600-400 MG-UNIT CHEW Take 1 tablet by mouth daily       Coenzyme Q10 (CO Q 10 PO) Take 1 tablet by mouth daily       COLLAGEN PO Take by mouth daily       glucosamine-chondroitin 500-400 MG CAPS per capsule Take 2 capsules by mouth daily        Omega-3 Fatty Acids (RA FISH OIL) 1000 MG CAPS Take 2,000 mg by mouth once a week       ursodiol (ACTIGALL) 250 MG tablet Take 1 tablet (250 mg) by mouth 4 times daily 360 tablet 3     vitamin B complex with vitamin C (VITAMIN  B COMPLEX) TABS tablet Take 1 tablet by mouth daily       Vitamin D, Cholecalciferol, 1000 units CAPS Take 1 tablet by mouth daily       VITAMIN E COMPLEX PO Take 400 Units by mouth three times a week       No current facility-administered medications for this visit.   Probiotic  Zinc 50 mg  Papaya enzymes prn stomach upset  Collagen peptide for 2  weeks    Exam  Gen Alert pleasant NAD  Resp No difficulty breathing. No cough  Skin No Jaundice  Eyes No icterus  Neuro GARZON  MSK no muscle wasting  Psyche Pleasant, appropriate. Well groomed.      Shasha Andrews is a 68 year old female who is being evaluated via a billable video visit.    Video-Visit Details  Type of service:  Video Visit  Video Start Time:1137  Video End Time: 1155  Originating Location (pt. Location):[place]  Distant Location (provider location):  [facility] [place]      Platform used for Video Visit: [employer] or [employer]          Again, thank you for allowing me to participate in the care of your patient.        Sincerely,        Montserrat Magallanes MD

## 2024-05-13 NOTE — PROGRESS NOTES
"Virtual Visit Details    Type of service:  Video Visit   Video Start Time: {video visit start/end time for provider to select:852062}  Video End Time:{video visit start/end time for provider to select:821572}    Originating Location (pt. Location): {video visit patient location:750114::\"Home\"}  {PROVIDER LOCATION On-site should be selected for visits conducted from your clinic location or adjoining [facility] hospital, academic office, or other nearby [facility] building. Off-site should be selected for all other provider locations, including home:060956}  Distant Location (provider location):  {virtual location provider:443263}  Platform used for Video Visit: {Virtual Visit Platforms:035056::\"[employer]\"}  "

## 2024-05-14 ENCOUNTER — TELEPHONE (OUTPATIENT)
Dept: ONCOLOGY | Facility: HOSPITAL | Age: 69
End: 2024-05-14
Payer: COMMERCIAL

## 2024-05-14 ENCOUNTER — HOSPITAL ENCOUNTER (OUTPATIENT)
Dept: ULTRASOUND IMAGING | Facility: HOSPITAL | Age: 69
Discharge: HOME | End: 2024-05-14
Attending: INTERNAL MEDICINE | Admitting: INTERNAL MEDICINE
Payer: COMMERCIAL

## 2024-05-14 DIAGNOSIS — K74.3 PRIMARY BILIARY CHOLANGITIS (H): ICD-10-CM

## 2024-05-14 PROCEDURE — 76705 ECHO EXAM OF ABDOMEN: CPT

## 2024-05-14 NOTE — TELEPHONE ENCOUNTER
General Call    Contacts         Type Contact Phone/Fax    05/14/2024 01:05 PM CDT Phone (Incoming) Shasha Andrews (Self) [phone] (M)          Reason for Call:  Appointment question 07/01/2024. PT has a question about her insurance and if PCP takes her [facility] medicare insurance?     What are your questions or concerns:     [address] [address] [phone]      Date of last appointment with provider: New Patient    Could we send this information to you in [employer] or would you prefer to receive a phone call?:   Patient would prefer a phone call     Okay to leave a detailed message?: Yes at Cell number on file:    Telephone Information:   Mobile [phone]     Cindi Gutiérrez

## 2024-05-14 NOTE — TELEPHONE ENCOUNTER
"[facility]: Cancer Care                                                                                        Writer received message from central call center with question from patient about insurance coverage for 7/1/24 consult with Dr. Bustos    \"Reason for Call:  Appointment question 07/01/2024. PT has a question about her insurance and if PCP takes her [facility] medicare insurance? \"    -Called Shasha to follow up on her message. Shared that  her insurance carrier may not be able to find Dr. Bustos's name listed at our clinic yet as he is a new provider  starting with our clinic on 5/20/24. Informed her that [facility] does not bill under provider but rather the Facility NPI number, The NPI number for [facility] was provided to her if she wants to call her insurance company to verify coverage. She does see many other providers within [facility] and she has had not difficulty with insurance coverage.  She also inquired about scheduling colonoscopy that was ordered by Dr. Magallanes. She understands that a  should be reaching out to her in the next couple days to arrange her apt but did provide the scheduling line for her reference if she would like to call directly. [phone].   She has writer's contact information for future reference.  ADDENDUM 5/15/24  Shasha called writer to clarify the information that we discussed on 5/14 as she was on a walk at the time of the call. She understands that the [facility] is under  the \"umbrella\" of [facility] and we bill under the [facility]  facility NPI number, we do not bill under the individual provider. She stated understanding.    Signature:  Jennifer Connors RN   "

## 2024-05-15 ENCOUNTER — TELEPHONE (OUTPATIENT)
Dept: GASTROENTEROLOGY | Facility: CLINIC | Age: 69
End: 2024-05-15
Payer: COMMERCIAL

## 2024-06-14 DIAGNOSIS — K74.3 PRIMARY BILIARY CHOLANGITIS (H): ICD-10-CM

## 2024-06-14 PROCEDURE — 91200 LIVER ELASTOGRAPHY: CPT | Mod: 26 | Performed by: PHYSICIAN ASSISTANT

## 2024-10-25 ENCOUNTER — TELEPHONE (OUTPATIENT)
Dept: GASTROENTEROLOGY | Facility: CLINIC | Age: 69
End: 2024-10-25
Payer: COMMERCIAL

## 2024-10-25 NOTE — TELEPHONE ENCOUNTER
"Endoscopy Scheduling Screen    Have you had any respiratory illness or flu-like symptoms in the last 10 days?  No    What is your communication preference for Instructions and/or Bowel Prep?   MyChart    What insurance is in the chart?  Other:  [facility]    Ordering/Referring Provider: Montserrat Magallanes MD in  HEPATOLOGY     (If ordering provider performs procedure, schedule with ordering provider unless otherwise instructed. )    BMI: Estimated body mass index is 21.46 kg/m  as calculated from the following:    Height as of 5/13/24: 1.626 m (5' 4\").    Weight as of 5/13/24: 56.7 kg (125 lb).     Sedation Ordered  moderate sedation.   If patient BMI > 50 do not schedule in [facility].    If [facility] > 45 [facility]    Are you taking methadone or Suboxone?  NO, No RN review required.    Have you been diagnosed and are being treated for severe PTSD or severe anxiety?  NO, No RN review required.    Are you taking any prescription medications for pain 3 or more times per week?   NO, No RN review required.    Do you have a history of malignant hyperthermia?  No    (Females) Are you currently pregnant?   No     Have you been diagnosed or told you have pulmonary hypertension?   No    Do you have an LVAD?  No    Have you been told you have moderate to severe sleep apnea?  No.    Have you been told you have COPD, asthma, or any other lung disease?  No    Do you have any heart conditions?  No     Have you ever had or are you waiting for an organ transplant?  No. Continue scheduling, no site restrictions.    Have you had a stroke or transient ischemic attack (TIA aka \"mini stroke\" in the last 6 months?   No    Have you been diagnosed with or been told you have cirrhosis of the liver?   Yes. (RN Review required for scheduling unless scheduling in Hospital.)    Are you currently on dialysis?   No    Do you need assistance transferring?   No    BMI: Estimated body mass index is 21.46 kg/m  as calculated from " "the following:    Height as of 5/13/24: 1.626 m (5' 4\").    Weight as of 5/13/24: 56.7 kg (125 lb).     Is patients BMI > 40 and scheduling location [facility]?  No    Do you take an injectable or oral medication for weight loss or diabetes (excluding insulin)?  No    Do you take the medication Naltrexone?  No    Do you take blood thinners?  No       Prep   Are you currently on dialysis or do you have chronic kidney disease?  No    Do you have a diagnosis of diabetes?  No    Do you have a diagnosis of cystic fibrosis (CF)?  No    On a regular basis do you go 3 -5 days between bowel movements?  No    BMI > 40?  No    Preferred Pharmacy:      [facility] - [address] [place] [address], [phone]      Final Scheduling Details     Procedure scheduled  Colonoscopy / Upper endoscopy (EGD)    Surgeon:  DARI     Date of procedure:  2/6     Pre-OP / PAC:   No - Not required for this site.    Location  [facility] - Per order.    Sedation   Moderate Sedation - Per order.      Patient Reminders:   You will receive a call from a Nurse to review instructions and health history.  This assessment must be completed prior to your procedure.  Failure to complete the Nurse assessment may result in the procedure being cancelled.      On the day of your procedure, please designate an adult(s) who can drive you home stay with you for the next 24 hours. The medicines used in the exam will make you sleepy. You will not be able to drive.      You cannot take public transportation, ride share services, or non-medical taxi service without a responsible caregiver.  Medical transport services are allowed with the requirement that a responsible caregiver will receive you at your destination.  We require that drivers and caregivers are confirmed prior to your procedure.    "

## 2024-11-18 ENCOUNTER — LAB (OUTPATIENT)
Dept: LAB | Facility: CLINIC | Age: 69
End: 2024-11-18
Payer: COMMERCIAL

## 2024-11-18 ENCOUNTER — HOSPITAL ENCOUNTER (OUTPATIENT)
Dept: ULTRASOUND IMAGING | Facility: HOSPITAL | Age: 69
Discharge: HOME | End: 2024-11-18
Attending: INTERNAL MEDICINE | Admitting: INTERNAL MEDICINE
Payer: COMMERCIAL

## 2024-11-18 DIAGNOSIS — K74.00 HEPATIC FIBROSIS, UNSPECIFIED: ICD-10-CM

## 2024-11-18 DIAGNOSIS — K74.3 PRIMARY BILIARY CHOLANGITIS (H): ICD-10-CM

## 2024-11-18 LAB
AFP SERPL-MCNC: 2.7 NG/ML
ALBUMIN SERPL BCG-MCNC: 4.1 G/DL (ref 3.5–5.2)
ALP SERPL-CCNC: 126 U/L (ref 40–150)
ALT SERPL W P-5'-P-CCNC: 18 U/L (ref 0–50)
ANION GAP SERPL CALCULATED.3IONS-SCNC: 8 MMOL/L (ref 7–15)
AST SERPL W P-5'-P-CCNC: 27 U/L (ref 0–45)
BILIRUB DIRECT SERPL-MCNC: 0.24 MG/DL (ref 0–0.3)
BILIRUB SERPL-MCNC: 0.9 MG/DL
BUN SERPL-MCNC: 11.5 MG/DL (ref 8–23)
CALCIUM SERPL-MCNC: 10 MG/DL (ref 8.8–10.4)
CHLORIDE SERPL-SCNC: 105 MMOL/L (ref 98–107)
CREAT SERPL-MCNC: 0.7 MG/DL (ref 0.51–0.95)
EGFRCR SERPLBLD CKD-EPI 2021: >90 ML/MIN/1.73M2
ERYTHROCYTE [DISTWIDTH] IN BLOOD BY AUTOMATED COUNT: 12.7 % (ref 10–15)
GLUCOSE SERPL-MCNC: 84 MG/DL (ref 70–99)
HCO3 SERPL-SCNC: 28 MMOL/L (ref 22–29)
HCT VFR BLD AUTO: 39.7 % (ref 35–47)
HGB BLD-MCNC: 13.4 G/DL (ref 11.7–15.7)
INR PPP: 1.04 (ref 0.85–1.15)
MCH RBC QN AUTO: 31.3 PG (ref 26.5–33)
MCHC RBC AUTO-ENTMCNC: 33.8 G/DL (ref 31.5–36.5)
MCV RBC AUTO: 93 FL (ref 78–100)
PLATELET # BLD AUTO: 187 10E3/UL (ref 150–450)
POTASSIUM SERPL-SCNC: 4.5 MMOL/L (ref 3.4–5.3)
PROT SERPL-MCNC: 7.4 G/DL (ref 6.4–8.3)
RBC # BLD AUTO: 4.28 10E6/UL (ref 3.8–5.2)
SODIUM SERPL-SCNC: 141 MMOL/L (ref 135–145)
WBC # BLD AUTO: 5.3 10E3/UL (ref 4–11)

## 2024-11-18 PROCEDURE — 36415 COLL VENOUS BLD VENIPUNCTURE: CPT

## 2024-11-18 PROCEDURE — 85027 COMPLETE CBC AUTOMATED: CPT

## 2024-11-18 PROCEDURE — 80053 COMPREHEN METABOLIC PANEL: CPT

## 2024-11-18 PROCEDURE — 76705 ECHO EXAM OF ABDOMEN: CPT

## 2024-11-18 PROCEDURE — 82105 ALPHA-FETOPROTEIN SERUM: CPT | Mod: GZ

## 2024-11-18 PROCEDURE — 85610 PROTHROMBIN TIME: CPT

## 2024-11-18 PROCEDURE — 82248 BILIRUBIN DIRECT: CPT

## 2024-11-19 DIAGNOSIS — K74.3 PRIMARY BILIARY CHOLANGITIS (H): Primary | ICD-10-CM

## 2025-02-01 ENCOUNTER — HEALTH MAINTENANCE LETTER (OUTPATIENT)
Age: 70
End: 2025-02-01

## 2025-02-19 ENCOUNTER — TELEPHONE (OUTPATIENT)
Dept: GASTROENTEROLOGY | Facility: CLINIC | Age: 70
End: 2025-02-19
Payer: COMMERCIAL

## 2025-02-19 NOTE — TELEPHONE ENCOUNTER
Pre visit planning completed.    Procedure details:    Patient scheduled for Colonoscopy/Upper endoscopy (EGD) on 03/06/2025.     Arrival time: 1200. Procedure time 1300    Facility location: [facility]; [address] [place] [address], [address] Floor, [place] [address]. Check in location: Main entrance at registration desk.  *Disclaimer: Drivers are to check in with patient and stay on campus during procedure.     Sedation type: Conscious sedation     Pre op exam needed? No.    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 No    Recent diagnosis of diverticulitis within the last 6 weeks? No    Medication review:    Diabetic? No    Anticoagulants? No    Weight loss medication/injectable? No GLP-1 medication per patient's medication list. Nursing to verify with pre-assessment call.    Other medication HOLDING recommendations:  N/A    Prep for procedure:     Bowel prep recommendation: Standard Miralax.   Due to: standard bowel prep    Procedure information and instructions sent via [employer]       Lorraine Bowers RN  Endoscopy Procedure Pre Assessment   [phone] option 3

## 2025-02-20 NOTE — TELEPHONE ENCOUNTER
Pre assessment completed for upcoming procedure.   (Please see previous telephone encounter notes for complete details)    Procedure details:    Arrival time and facility location reviewed.    Pre op exam needed? No.    Designated  policy reviewed and that site requests drivers to check in and stay on campus. Instructed to have someone stay 6  hours post procedure.       Medication review:  None. Patient denies GLP-1.       Prep for procedure:     Procedure prep instructions reviewed.        Any additional information needed:  N/A      Patient verbalized understanding and had no questions or concerns at this time.      Keegan Varner RN  Endoscopy Procedure Pre Assessment   [phone] option 3

## 2025-03-06 ENCOUNTER — HOSPITAL ENCOUNTER (OUTPATIENT)
Facility: CLINIC | Age: 70
Discharge: HOME | End: 2025-03-06
Attending: INTERNAL MEDICINE | Admitting: INTERNAL MEDICINE
Payer: COMMERCIAL

## 2025-03-06 VITALS
SYSTOLIC BLOOD PRESSURE: 154 MMHG | HEART RATE: 62 BPM | RESPIRATION RATE: 20 BRPM | OXYGEN SATURATION: 100 % | DIASTOLIC BLOOD PRESSURE: 92 MMHG

## 2025-03-06 LAB
COLONOSCOPY: NORMAL
UPPER GI ENDOSCOPY: NORMAL

## 2025-03-06 PROCEDURE — G0121 COLON CA SCRN NOT HI RSK IND: HCPCS | Performed by: INTERNAL MEDICINE

## 2025-03-06 PROCEDURE — 45378 DIAGNOSTIC COLONOSCOPY: CPT | Performed by: INTERNAL MEDICINE

## 2025-03-06 PROCEDURE — 43235 EGD DIAGNOSTIC BRUSH WASH: CPT | Performed by: INTERNAL MEDICINE

## 2025-03-06 PROCEDURE — 250N000011 HC RX IP 250 OP 636: Performed by: INTERNAL MEDICINE

## 2025-03-06 PROCEDURE — G0500 MOD SEDAT ENDO SERVICE >5YRS: HCPCS | Performed by: INTERNAL MEDICINE

## 2025-03-06 PROCEDURE — 250N000009 HC RX 250: Performed by: INTERNAL MEDICINE

## 2025-03-06 RX ORDER — FENTANYL CITRATE 50 UG/ML
INJECTION, SOLUTION INTRAMUSCULAR; INTRAVENOUS PRN
Status: DISCONTINUED | OUTPATIENT
Start: 2025-03-06 | End: 2025-03-06 | Stop reason: HOSPADM

## 2025-03-06 ASSESSMENT — ACTIVITIES OF DAILY LIVING (ADL)
ADLS_ACUITY_SCORE: 41

## (undated) RX ORDER — FENTANYL CITRATE 50 UG/ML
INJECTION, SOLUTION INTRAMUSCULAR; INTRAVENOUS
Status: DISPENSED
Start: 2025-03-06

## (undated) RX ORDER — FENTANYL CITRATE 50 UG/ML
INJECTION, SOLUTION INTRAMUSCULAR; INTRAVENOUS
Status: DISPENSED
Start: 2021-10-14